# Patient Record
Sex: MALE | Race: WHITE | NOT HISPANIC OR LATINO | Employment: OTHER | URBAN - METROPOLITAN AREA
[De-identification: names, ages, dates, MRNs, and addresses within clinical notes are randomized per-mention and may not be internally consistent; named-entity substitution may affect disease eponyms.]

---

## 2018-01-01 ENCOUNTER — APPOINTMENT (OUTPATIENT)
Dept: CT IMAGING | Facility: HOSPITAL | Age: 78
End: 2018-01-01

## 2018-01-01 ENCOUNTER — HOSPITAL ENCOUNTER (INPATIENT)
Facility: HOSPITAL | Age: 78
LOS: 6 days | End: 2018-10-02
Attending: EMERGENCY MEDICINE | Admitting: INTERNAL MEDICINE

## 2018-01-01 ENCOUNTER — APPOINTMENT (OUTPATIENT)
Dept: GENERAL RADIOLOGY | Facility: HOSPITAL | Age: 78
End: 2018-01-01

## 2018-01-01 ENCOUNTER — APPOINTMENT (OUTPATIENT)
Dept: MRI IMAGING | Facility: HOSPITAL | Age: 78
End: 2018-01-01
Attending: PSYCHIATRY & NEUROLOGY

## 2018-01-01 ENCOUNTER — APPOINTMENT (OUTPATIENT)
Dept: CARDIOLOGY | Facility: HOSPITAL | Age: 78
End: 2018-01-01
Attending: PSYCHIATRY & NEUROLOGY

## 2018-01-01 VITALS
DIASTOLIC BLOOD PRESSURE: 70 MMHG | WEIGHT: 244 LBS | SYSTOLIC BLOOD PRESSURE: 128 MMHG | TEMPERATURE: 98 F | OXYGEN SATURATION: 62 % | BODY MASS INDEX: 33.05 KG/M2 | HEART RATE: 28 BPM | HEIGHT: 72 IN

## 2018-01-01 DIAGNOSIS — I63.9 ACUTE ISCHEMIC STROKE (HCC): Primary | ICD-10-CM

## 2018-01-01 DIAGNOSIS — N28.9 RENAL INSUFFICIENCY: ICD-10-CM

## 2018-01-01 DIAGNOSIS — R41.841 COGNITIVE COMMUNICATION DEFICIT: ICD-10-CM

## 2018-01-01 DIAGNOSIS — Z78.9 IMPAIRED MOBILITY AND ADLS: ICD-10-CM

## 2018-01-01 DIAGNOSIS — M25.552 LEFT HIP PAIN: ICD-10-CM

## 2018-01-01 DIAGNOSIS — Z74.09 IMPAIRED FUNCTIONAL MOBILITY, BALANCE, GAIT, AND ENDURANCE: ICD-10-CM

## 2018-01-01 DIAGNOSIS — Z74.09 IMPAIRED MOBILITY AND ADLS: ICD-10-CM

## 2018-01-01 DIAGNOSIS — R13.10 DYSPHAGIA, UNSPECIFIED TYPE: ICD-10-CM

## 2018-01-01 LAB
ALBUMIN SERPL-MCNC: 2.87 G/DL (ref 3.2–4.8)
ALBUMIN SERPL-MCNC: 2.98 G/DL (ref 3.2–4.8)
ALBUMIN SERPL-MCNC: 3.28 G/DL (ref 3.2–4.8)
ALBUMIN/GLOB SERPL: 1.2 G/DL (ref 1.5–2.5)
ALP SERPL-CCNC: 65 U/L (ref 25–100)
ALP SERPL-CCNC: 86 U/L (ref 25–100)
ALT SERPL W P-5'-P-CCNC: 24 U/L (ref 7–40)
ALT SERPL W P-5'-P-CCNC: 31 U/L (ref 7–40)
ANION GAP SERPL CALCULATED.3IONS-SCNC: 10 MMOL/L (ref 3–11)
ANION GAP SERPL CALCULATED.3IONS-SCNC: 19 MMOL/L (ref 3–11)
ANION GAP SERPL CALCULATED.3IONS-SCNC: 2 MMOL/L (ref 3–11)
ANION GAP SERPL CALCULATED.3IONS-SCNC: 4 MMOL/L (ref 3–11)
ANION GAP SERPL CALCULATED.3IONS-SCNC: 8 MMOL/L (ref 3–11)
ARTICHOKE IGE QN: 62 MG/DL (ref 0–130)
AST SERPL-CCNC: 104 U/L (ref 0–33)
AST SERPL-CCNC: 41 U/L (ref 0–33)
BACTERIA BLD CULT: ABNORMAL
BASOPHILS # BLD AUTO: 0.02 10*3/MM3 (ref 0–0.2)
BASOPHILS # BLD AUTO: 0.03 10*3/MM3 (ref 0–0.2)
BASOPHILS # BLD AUTO: 0.03 10*3/MM3 (ref 0–0.2)
BASOPHILS NFR BLD AUTO: 0.2 % (ref 0–1)
BASOPHILS NFR BLD AUTO: 0.2 % (ref 0–1)
BASOPHILS NFR BLD AUTO: 0.3 % (ref 0–1)
BH CV ECHO MEAS - AO MAX PG (FULL): 2.3 MMHG
BH CV ECHO MEAS - AO MAX PG: 8.6 MMHG
BH CV ECHO MEAS - AO MEAN PG (FULL): 1.6 MMHG
BH CV ECHO MEAS - AO MEAN PG: 3.7 MMHG
BH CV ECHO MEAS - AO ROOT AREA (BSA CORRECTED): 1.4
BH CV ECHO MEAS - AO ROOT AREA: 8.7 CM^2
BH CV ECHO MEAS - AO ROOT DIAM: 3.3 CM
BH CV ECHO MEAS - AO V2 MAX: 147 CM/SEC
BH CV ECHO MEAS - AO V2 MEAN: 86.7 CM/SEC
BH CV ECHO MEAS - AO V2 VTI: 26.2 CM
BH CV ECHO MEAS - ASC AORTA: 2.3 CM
BH CV ECHO MEAS - AVA(I,A): 1.9 CM^2
BH CV ECHO MEAS - AVA(I,D): 1.9 CM^2
BH CV ECHO MEAS - AVA(V,A): 2.6 CM^2
BH CV ECHO MEAS - AVA(V,D): 2.6 CM^2
BH CV ECHO MEAS - BSA(HAYCOCK): 2.4 M^2
BH CV ECHO MEAS - BSA(HAYCOCK): 2.4 M^2
BH CV ECHO MEAS - BSA: 2.3 M^2
BH CV ECHO MEAS - BSA: 2.3 M^2
BH CV ECHO MEAS - BZI_BMI: 33.1 KILOGRAMS/M^2
BH CV ECHO MEAS - BZI_BMI: 33.1 KILOGRAMS/M^2
BH CV ECHO MEAS - BZI_METRIC_HEIGHT: 182.9 CM
BH CV ECHO MEAS - BZI_METRIC_HEIGHT: 182.9 CM
BH CV ECHO MEAS - BZI_METRIC_WEIGHT: 110.7 KG
BH CV ECHO MEAS - BZI_METRIC_WEIGHT: 110.7 KG
BH CV ECHO MEAS - EDV(CUBED): 170.3 ML
BH CV ECHO MEAS - EDV(MOD-SP2): 44 ML
BH CV ECHO MEAS - EDV(MOD-SP4): 72 ML
BH CV ECHO MEAS - EDV(TEICH): 150.1 ML
BH CV ECHO MEAS - EF(CUBED): 26.7 %
BH CV ECHO MEAS - EF(MOD-SP2): 56.8 %
BH CV ECHO MEAS - EF(MOD-SP4): 65.3 %
BH CV ECHO MEAS - EF(TEICH): 21.3 %
BH CV ECHO MEAS - ESV(CUBED): 124.8 ML
BH CV ECHO MEAS - ESV(MOD-SP2): 19 ML
BH CV ECHO MEAS - ESV(MOD-SP4): 25 ML
BH CV ECHO MEAS - ESV(TEICH): 118.1 ML
BH CV ECHO MEAS - FS: 9.8 %
BH CV ECHO MEAS - IVS/LVPW: 1.2
BH CV ECHO MEAS - IVSD: 1.2 CM
BH CV ECHO MEAS - LAD MAJOR: 6 CM
BH CV ECHO MEAS - LAT PEAK E' VEL: 4.7 CM/SEC
BH CV ECHO MEAS - LATERAL E/E' RATIO: 17.3
BH CV ECHO MEAS - LV DIASTOLIC VOL/BSA (35-75): 31.1 ML/M^2
BH CV ECHO MEAS - LV MASS(C)D: 238.3 GRAMS
BH CV ECHO MEAS - LV MASS(C)DI: 102.8 GRAMS/M^2
BH CV ECHO MEAS - LV MAX PG: 6.3 MMHG
BH CV ECHO MEAS - LV MEAN PG: 2 MMHG
BH CV ECHO MEAS - LV SYSTOLIC VOL/BSA (12-30): 10.8 ML/M^2
BH CV ECHO MEAS - LV V1 MAX: 125.6 CM/SEC
BH CV ECHO MEAS - LV V1 MEAN: 61.7 CM/SEC
BH CV ECHO MEAS - LV V1 VTI: 16.7 CM
BH CV ECHO MEAS - LVIDD: 5.5 CM
BH CV ECHO MEAS - LVIDS: 5 CM
BH CV ECHO MEAS - LVLD AP2: 6.8 CM
BH CV ECHO MEAS - LVLD AP4: 6.6 CM
BH CV ECHO MEAS - LVLS AP2: 5.5 CM
BH CV ECHO MEAS - LVLS AP4: 5.7 CM
BH CV ECHO MEAS - LVOT AREA (M): 3.1 CM^2
BH CV ECHO MEAS - LVOT AREA: 3 CM^2
BH CV ECHO MEAS - LVOT DIAM: 2 CM
BH CV ECHO MEAS - LVPWD: 0.98 CM
BH CV ECHO MEAS - MED PEAK E' VEL: 5.3 CM/SEC
BH CV ECHO MEAS - MEDIAL E/E' RATIO: 15.5
BH CV ECHO MEAS - MV A MAX VEL: 65.3 CM/SEC
BH CV ECHO MEAS - MV DEC TIME: 0.22 SEC
BH CV ECHO MEAS - MV E MAX VEL: 83.9 CM/SEC
BH CV ECHO MEAS - MV E/A: 1.3
BH CV ECHO MEAS - MV MAX PG: 3 MMHG
BH CV ECHO MEAS - MV MEAN PG: 0.93 MMHG
BH CV ECHO MEAS - MV V2 MAX: 86.6 CM/SEC
BH CV ECHO MEAS - MV V2 MEAN: 43.2 CM/SEC
BH CV ECHO MEAS - MV V2 VTI: 26.6 CM
BH CV ECHO MEAS - MVA(VTI): 1.9 CM^2
BH CV ECHO MEAS - PA ACC SLOPE: 1071 CM/SEC^2
BH CV ECHO MEAS - PA ACC TIME: 0.11 SEC
BH CV ECHO MEAS - PA MAX PG: 13.8 MMHG
BH CV ECHO MEAS - PA PR(ACCEL): 28.3 MMHG
BH CV ECHO MEAS - PA V2 MAX: 186 CM/SEC
BH CV ECHO MEAS - RVSP: 31 MMHG
BH CV ECHO MEAS - SI(AO): 98 ML/M^2
BH CV ECHO MEAS - SI(CUBED): 19.6 ML/M^2
BH CV ECHO MEAS - SI(LVOT): 21.9 ML/M^2
BH CV ECHO MEAS - SI(MOD-SP2): 10.8 ML/M^2
BH CV ECHO MEAS - SI(MOD-SP4): 20.3 ML/M^2
BH CV ECHO MEAS - SI(TEICH): 13.8 ML/M^2
BH CV ECHO MEAS - SV(AO): 227.3 ML
BH CV ECHO MEAS - SV(CUBED): 45.5 ML
BH CV ECHO MEAS - SV(LVOT): 50.8 ML
BH CV ECHO MEAS - SV(MOD-SP2): 25 ML
BH CV ECHO MEAS - SV(MOD-SP4): 47 ML
BH CV ECHO MEAS - SV(TEICH): 32 ML
BH CV ECHO MEAS - TAPSE (>1.6): 1.11 CM2
BH CV ECHO MEAS - TR MAX PG: 26 MMHG
BH CV ECHO MEAS - TR MAX VEL: 252.3 CM/SEC
BH CV ECHO MEASUREMENTS AVERAGE E/E' RATIO: 16.78
BH CV XLRA - TDI S': 6.89 CM/SEC
BH CV XLRA MEAS LEFT CCA RATIO VEL: 84 CM/SEC
BH CV XLRA MEAS LEFT DIST CCA EDV: 8.3 CM/SEC
BH CV XLRA MEAS LEFT DIST CCA PSV: 78.1 CM/SEC
BH CV XLRA MEAS LEFT DIST ICA EDV: 11.8 CM/SEC
BH CV XLRA MEAS LEFT DIST ICA PSV: 49.6 CM/SEC
BH CV XLRA MEAS LEFT ICA RATIO VEL: 51.1 CM/SEC
BH CV XLRA MEAS LEFT ICA/CCA RATIO: 0.61
BH CV XLRA MEAS LEFT MID CCA EDV: 9.3 CM/SEC
BH CV XLRA MEAS LEFT MID CCA PSV: 71.2 CM/SEC
BH CV XLRA MEAS LEFT MID ICA EDV: 12.3 CM/SEC
BH CV XLRA MEAS LEFT MID ICA PSV: 49.1 CM/SEC
BH CV XLRA MEAS LEFT PROX CCA EDV: 6.9 CM/SEC
BH CV XLRA MEAS LEFT PROX CCA PSV: 84.5 CM/SEC
BH CV XLRA MEAS LEFT PROX ECA EDV: 8.3 CM/SEC
BH CV XLRA MEAS LEFT PROX ECA PSV: 87.4 CM/SEC
BH CV XLRA MEAS LEFT PROX ICA EDV: 9.3 CM/SEC
BH CV XLRA MEAS LEFT PROX ICA PSV: 51.6 CM/SEC
BH CV XLRA MEAS LEFT PROX SCLA PSV: 182.3 CM/SEC
BH CV XLRA MEAS LEFT VERTEBRAL A EDV: 11.3 CM/SEC
BH CV XLRA MEAS LEFT VERTEBRAL A PSV: 47.1 CM/SEC
BH CV XLRA MEAS RIGHT CCA RATIO VEL: 91.8 CM/SEC
BH CV XLRA MEAS RIGHT DIST CCA EDV: 7.4 CM/SEC
BH CV XLRA MEAS RIGHT DIST CCA PSV: 79.6 CM/SEC
BH CV XLRA MEAS RIGHT DIST ICA EDV: 20.1 CM/SEC
BH CV XLRA MEAS RIGHT DIST ICA PSV: 73.5 CM/SEC
BH CV XLRA MEAS RIGHT ICA RATIO VEL: 77 CM/SEC
BH CV XLRA MEAS RIGHT ICA/CCA RATIO: 0.84
BH CV XLRA MEAS RIGHT MID CCA EDV: 9.8 CM/SEC
BH CV XLRA MEAS RIGHT MID CCA PSV: 92.3 CM/SEC
BH CV XLRA MEAS RIGHT MID ICA EDV: 17.6 CM/SEC
BH CV XLRA MEAS RIGHT MID ICA PSV: 73.5 CM/SEC
BH CV XLRA MEAS RIGHT PROX CCA EDV: 10.7 CM/SEC
BH CV XLRA MEAS RIGHT PROX CCA PSV: 91.1 CM/SEC
BH CV XLRA MEAS RIGHT PROX ECA EDV: 11.8 CM/SEC
BH CV XLRA MEAS RIGHT PROX ECA PSV: 134.4 CM/SEC
BH CV XLRA MEAS RIGHT PROX ICA EDV: 14.1 CM/SEC
BH CV XLRA MEAS RIGHT PROX ICA PSV: 77.8 CM/SEC
BH CV XLRA MEAS RIGHT PROX SCLA PSV: 155.4 CM/SEC
BH CV XLRA MEAS RIGHT VERTEBRAL A EDV: 6.6 CM/SEC
BH CV XLRA MEAS RIGHT VERTEBRAL A PSV: 36.3 CM/SEC
BILIRUB SERPL-MCNC: 1.3 MG/DL (ref 0.3–1.2)
BILIRUB SERPL-MCNC: 1.6 MG/DL (ref 0.3–1.2)
BUN BLD-MCNC: 27 MG/DL (ref 9–23)
BUN BLD-MCNC: 32 MG/DL (ref 9–23)
BUN BLD-MCNC: 32 MG/DL (ref 9–23)
BUN BLDA-MCNC: 35 MG/DL (ref 8–26)
BUN/CREAT SERPL: 15.3 (ref 7–25)
BUN/CREAT SERPL: 17.4 (ref 7–25)
BUN/CREAT SERPL: 21.8 (ref 7–25)
BUN/CREAT SERPL: 21.9 (ref 7–25)
CA-I BLDA-SCNC: 1.24 MMOL/L (ref 1.2–1.32)
CALCIUM SPEC-SCNC: 7.9 MG/DL (ref 8.7–10.4)
CALCIUM SPEC-SCNC: 7.9 MG/DL (ref 8.7–10.4)
CALCIUM SPEC-SCNC: 8 MG/DL (ref 8.7–10.4)
CALCIUM SPEC-SCNC: 8.4 MG/DL (ref 8.7–10.4)
CALCIUM SPEC-SCNC: 8.8 MG/DL (ref 8.7–10.4)
CHLORIDE BLDA-SCNC: 103 MMOL/L (ref 98–109)
CHLORIDE SERPL-SCNC: 107 MMOL/L (ref 99–109)
CHLORIDE SERPL-SCNC: 114 MMOL/L (ref 99–109)
CHLORIDE SERPL-SCNC: 114 MMOL/L (ref 99–109)
CHLORIDE SERPL-SCNC: 119 MMOL/L (ref 99–109)
CHLORIDE SERPL-SCNC: 120 MMOL/L (ref 99–109)
CHOLEST SERPL-MCNC: 104 MG/DL (ref 0–200)
CHOLEST SERPL-MCNC: 107 MG/DL (ref 0–200)
CO2 BLDA-SCNC: 28 MMOL/L (ref 24–29)
CO2 SERPL-SCNC: 20 MMOL/L (ref 20–31)
CO2 SERPL-SCNC: 21 MMOL/L (ref 20–31)
CO2 SERPL-SCNC: 22 MMOL/L (ref 20–31)
CO2 SERPL-SCNC: 23 MMOL/L (ref 20–31)
CO2 SERPL-SCNC: 24 MMOL/L (ref 20–31)
CREAT BLD-MCNC: 1.08 MG/DL (ref 0.6–1.3)
CREAT BLD-MCNC: 1.46 MG/DL (ref 0.6–1.3)
CREAT BLD-MCNC: 1.47 MG/DL (ref 0.6–1.3)
CREAT BLD-MCNC: 1.55 MG/DL (ref 0.6–1.3)
CREAT BLD-MCNC: 1.77 MG/DL (ref 0.6–1.3)
CREAT BLDA-MCNC: 1.6 MG/DL (ref 0.6–1.3)
D-LACTATE SERPL-SCNC: 1.8 MMOL/L (ref 0.5–2)
DEPRECATED RDW RBC AUTO: 52.5 FL (ref 37–54)
DEPRECATED RDW RBC AUTO: 53.1 FL (ref 37–54)
DEPRECATED RDW RBC AUTO: 54.3 FL (ref 37–54)
DEPRECATED RDW RBC AUTO: 55.6 FL (ref 37–54)
DEPRECATED RDW RBC AUTO: 56.8 FL (ref 37–54)
DEPRECATED RDW RBC AUTO: 58.1 FL (ref 37–54)
EOSINOPHIL # BLD AUTO: 0.02 10*3/MM3 (ref 0–0.3)
EOSINOPHIL # BLD AUTO: 0.17 10*3/MM3 (ref 0–0.3)
EOSINOPHIL # BLD AUTO: 0.23 10*3/MM3 (ref 0–0.3)
EOSINOPHIL NFR BLD AUTO: 0.1 % (ref 0–3)
EOSINOPHIL NFR BLD AUTO: 1.8 % (ref 0–3)
EOSINOPHIL NFR BLD AUTO: 1.9 % (ref 0–3)
ERYTHROCYTE [DISTWIDTH] IN BLOOD BY AUTOMATED COUNT: 14.8 % (ref 11.3–14.5)
ERYTHROCYTE [DISTWIDTH] IN BLOOD BY AUTOMATED COUNT: 15 % (ref 11.3–14.5)
ERYTHROCYTE [DISTWIDTH] IN BLOOD BY AUTOMATED COUNT: 15.2 % (ref 11.3–14.5)
ERYTHROCYTE [DISTWIDTH] IN BLOOD BY AUTOMATED COUNT: 15.3 % (ref 11.3–14.5)
ERYTHROCYTE [DISTWIDTH] IN BLOOD BY AUTOMATED COUNT: 15.4 % (ref 11.3–14.5)
ERYTHROCYTE [DISTWIDTH] IN BLOOD BY AUTOMATED COUNT: 15.5 % (ref 11.3–14.5)
GFR SERPL CREATININE-BSD FRML MDRD: 37 ML/MIN/1.73
GFR SERPL CREATININE-BSD FRML MDRD: 44 ML/MIN/1.73
GFR SERPL CREATININE-BSD FRML MDRD: 46 ML/MIN/1.73
GFR SERPL CREATININE-BSD FRML MDRD: 47 ML/MIN/1.73
GLOBULIN UR ELPH-MCNC: 2.4 GM/DL
GLUCOSE BLD-MCNC: 114 MG/DL (ref 70–100)
GLUCOSE BLD-MCNC: 117 MG/DL (ref 70–100)
GLUCOSE BLD-MCNC: 130 MG/DL (ref 70–100)
GLUCOSE BLD-MCNC: 151 MG/DL (ref 70–100)
GLUCOSE BLD-MCNC: 215 MG/DL (ref 70–100)
GLUCOSE BLDC GLUCOMTR-MCNC: 112 MG/DL (ref 70–130)
GLUCOSE BLDC GLUCOMTR-MCNC: 114 MG/DL (ref 70–130)
GLUCOSE BLDC GLUCOMTR-MCNC: 126 MG/DL (ref 70–130)
GLUCOSE BLDC GLUCOMTR-MCNC: 129 MG/DL (ref 70–130)
GLUCOSE BLDC GLUCOMTR-MCNC: 130 MG/DL (ref 70–130)
GLUCOSE BLDC GLUCOMTR-MCNC: 130 MG/DL (ref 70–130)
GLUCOSE BLDC GLUCOMTR-MCNC: 132 MG/DL (ref 70–130)
GLUCOSE BLDC GLUCOMTR-MCNC: 133 MG/DL (ref 70–130)
GLUCOSE BLDC GLUCOMTR-MCNC: 137 MG/DL (ref 70–130)
GLUCOSE BLDC GLUCOMTR-MCNC: 139 MG/DL (ref 70–130)
GLUCOSE BLDC GLUCOMTR-MCNC: 144 MG/DL (ref 70–130)
GLUCOSE BLDC GLUCOMTR-MCNC: 154 MG/DL (ref 70–130)
GLUCOSE BLDC GLUCOMTR-MCNC: 166 MG/DL (ref 70–130)
GLUCOSE BLDC GLUCOMTR-MCNC: 176 MG/DL (ref 70–130)
GLUCOSE BLDC GLUCOMTR-MCNC: 177 MG/DL (ref 70–130)
GLUCOSE BLDC GLUCOMTR-MCNC: 181 MG/DL (ref 70–130)
GLUCOSE BLDC GLUCOMTR-MCNC: 186 MG/DL (ref 70–130)
GLUCOSE BLDC GLUCOMTR-MCNC: 193 MG/DL (ref 70–130)
GLUCOSE BLDC GLUCOMTR-MCNC: 193 MG/DL (ref 70–130)
GLUCOSE BLDC GLUCOMTR-MCNC: 210 MG/DL (ref 70–130)
GLUCOSE BLDC GLUCOMTR-MCNC: 218 MG/DL (ref 70–130)
HBA1C MFR BLD: 6.2 % (ref 4.8–5.6)
HCT VFR BLD AUTO: 33 % (ref 38.9–50.9)
HCT VFR BLD AUTO: 36.4 % (ref 38.9–50.9)
HCT VFR BLD AUTO: 36.5 % (ref 38.9–50.9)
HCT VFR BLD AUTO: 36.5 % (ref 38.9–50.9)
HCT VFR BLD AUTO: 41 % (ref 38.9–50.9)
HCT VFR BLD AUTO: 44.9 % (ref 38.9–50.9)
HCT VFR BLDA CALC: 45 % (ref 38–51)
HDLC SERPL-MCNC: 34 MG/DL (ref 40–60)
HGB BLD-MCNC: 10.2 G/DL (ref 13.1–17.5)
HGB BLD-MCNC: 11.4 G/DL (ref 13.1–17.5)
HGB BLD-MCNC: 11.5 G/DL (ref 13.1–17.5)
HGB BLD-MCNC: 11.5 G/DL (ref 13.1–17.5)
HGB BLD-MCNC: 13.5 G/DL (ref 13.1–17.5)
HGB BLD-MCNC: 14.8 G/DL (ref 13.1–17.5)
HGB BLDA-MCNC: 15.3 G/DL (ref 12–17)
HOLD SPECIMEN: NORMAL
HOLD SPECIMEN: NORMAL
IMM GRANULOCYTES # BLD: 0.06 10*3/MM3 (ref 0–0.03)
IMM GRANULOCYTES # BLD: 0.07 10*3/MM3 (ref 0–0.03)
IMM GRANULOCYTES # BLD: 0.09 10*3/MM3 (ref 0–0.03)
IMM GRANULOCYTES NFR BLD: 0.5 % (ref 0–0.6)
IMM GRANULOCYTES NFR BLD: 0.6 % (ref 0–0.6)
IMM GRANULOCYTES NFR BLD: 0.7 % (ref 0–0.6)
INR PPP: 1.3 (ref 0.8–1.2)
LEFT ATRIUM VOLUME INDEX: 22.9 ML/M^2
LYMPHOCYTES # BLD AUTO: 1.09 10*3/MM3 (ref 0.6–4.8)
LYMPHOCYTES # BLD AUTO: 1.11 10*3/MM3 (ref 0.6–4.8)
LYMPHOCYTES # BLD AUTO: 1.34 10*3/MM3 (ref 0.6–4.8)
LYMPHOCYTES NFR BLD AUTO: 11.5 % (ref 24–44)
LYMPHOCYTES NFR BLD AUTO: 8.3 % (ref 24–44)
LYMPHOCYTES NFR BLD AUTO: 9.2 % (ref 24–44)
MAGNESIUM SERPL-MCNC: 2 MG/DL (ref 1.3–2.7)
MAGNESIUM SERPL-MCNC: 2.1 MG/DL (ref 1.3–2.7)
MAGNESIUM SERPL-MCNC: 2.1 MG/DL (ref 1.3–2.7)
MAGNESIUM SERPL-MCNC: 2.2 MG/DL (ref 1.3–2.7)
MAXIMAL PREDICTED HEART RATE: 143 BPM
MCH RBC QN AUTO: 31.4 PG (ref 27–31)
MCH RBC QN AUTO: 31.4 PG (ref 27–31)
MCH RBC QN AUTO: 31.6 PG (ref 27–31)
MCH RBC QN AUTO: 31.7 PG (ref 27–31)
MCH RBC QN AUTO: 31.9 PG (ref 27–31)
MCH RBC QN AUTO: 32 PG (ref 27–31)
MCHC RBC AUTO-ENTMCNC: 30.9 G/DL (ref 32–36)
MCHC RBC AUTO-ENTMCNC: 31.2 G/DL (ref 32–36)
MCHC RBC AUTO-ENTMCNC: 31.5 G/DL (ref 32–36)
MCHC RBC AUTO-ENTMCNC: 31.6 G/DL (ref 32–36)
MCHC RBC AUTO-ENTMCNC: 32.9 G/DL (ref 32–36)
MCHC RBC AUTO-ENTMCNC: 33 G/DL (ref 32–36)
MCV RBC AUTO: 101.1 FL (ref 80–99)
MCV RBC AUTO: 102.5 FL (ref 80–99)
MCV RBC AUTO: 96.9 FL (ref 80–99)
MCV RBC AUTO: 97 FL (ref 80–99)
MCV RBC AUTO: 99.5 FL (ref 80–99)
MCV RBC AUTO: 99.7 FL (ref 80–99)
MONOCYTES # BLD AUTO: 0.54 10*3/MM3 (ref 0–1)
MONOCYTES # BLD AUTO: 0.6 10*3/MM3 (ref 0–1)
MONOCYTES # BLD AUTO: 0.66 10*3/MM3 (ref 0–1)
MONOCYTES NFR BLD AUTO: 4.1 % (ref 0–12)
MONOCYTES NFR BLD AUTO: 5 % (ref 0–12)
MONOCYTES NFR BLD AUTO: 5.7 % (ref 0–12)
NEUTROPHILS # BLD AUTO: 10 10*3/MM3 (ref 1.5–8.3)
NEUTROPHILS # BLD AUTO: 14.17 10*3/MM3 (ref 1.5–8.3)
NEUTROPHILS # BLD AUTO: 7.62 10*3/MM3 (ref 1.5–8.3)
NEUTROPHILS NFR BLD AUTO: 80.7 % (ref 41–71)
NEUTROPHILS NFR BLD AUTO: 83.2 % (ref 41–71)
NEUTROPHILS NFR BLD AUTO: 87.3 % (ref 41–71)
PHOSPHATE SERPL-MCNC: 1.2 MG/DL (ref 2.4–5.1)
PHOSPHATE SERPL-MCNC: 2.2 MG/DL (ref 2.4–5.1)
PHOSPHATE SERPL-MCNC: 2.2 MG/DL (ref 2.4–5.1)
PHOSPHATE SERPL-MCNC: 2.5 MG/DL (ref 2.4–5.1)
PHOSPHATE SERPL-MCNC: 3.2 MG/DL (ref 2.4–5.1)
PHOSPHATE SERPL-MCNC: 4 MG/DL (ref 2.4–5.1)
PLATELET # BLD AUTO: 137 10*3/MM3 (ref 150–450)
PLATELET # BLD AUTO: 138 10*3/MM3 (ref 150–450)
PLATELET # BLD AUTO: 139 10*3/MM3 (ref 150–450)
PLATELET # BLD AUTO: 141 10*3/MM3 (ref 150–450)
PLATELET # BLD AUTO: 143 10*3/MM3 (ref 150–450)
PLATELET # BLD AUTO: 160 10*3/MM3 (ref 150–450)
PMV BLD AUTO: 10.2 FL (ref 6–12)
PMV BLD AUTO: 10.3 FL (ref 6–12)
PMV BLD AUTO: 10.4 FL (ref 6–12)
PMV BLD AUTO: 10.5 FL (ref 6–12)
POTASSIUM BLD-SCNC: 3.8 MMOL/L (ref 3.5–5.5)
POTASSIUM BLD-SCNC: 3.8 MMOL/L (ref 3.5–5.5)
POTASSIUM BLD-SCNC: 4.1 MMOL/L (ref 3.5–5.5)
POTASSIUM BLD-SCNC: 4.2 MMOL/L (ref 3.5–5.5)
POTASSIUM BLD-SCNC: 4.5 MMOL/L (ref 3.5–5.5)
POTASSIUM BLDA-SCNC: 3.9 MMOL/L (ref 3.5–4.9)
PREALB SERPL-MCNC: <5 MG/DL (ref 10–40)
PROT SERPL-MCNC: 5.3 G/DL (ref 5.7–8.2)
PROT SERPL-MCNC: 5.7 G/DL (ref 5.7–8.2)
PROTHROMBIN TIME: 15.2 SECONDS (ref 12.8–15.2)
RBC # BLD AUTO: 3.22 10*6/MM3 (ref 4.2–5.76)
RBC # BLD AUTO: 3.61 10*6/MM3 (ref 4.2–5.76)
RBC # BLD AUTO: 3.66 10*6/MM3 (ref 4.2–5.76)
RBC # BLD AUTO: 3.66 10*6/MM3 (ref 4.2–5.76)
RBC # BLD AUTO: 4.23 10*6/MM3 (ref 4.2–5.76)
RBC # BLD AUTO: 4.63 10*6/MM3 (ref 4.2–5.76)
SODIUM BLD-SCNC: 140 MMOL/L (ref 132–146)
SODIUM BLD-SCNC: 144 MMOL/L (ref 132–146)
SODIUM BLD-SCNC: 145 MMOL/L (ref 132–146)
SODIUM BLD-SCNC: 147 MMOL/L (ref 132–146)
SODIUM BLD-SCNC: 151 MMOL/L (ref 132–146)
SODIUM BLDA-SCNC: 144 MMOL/L (ref 138–146)
STRESS TARGET HR: 122 BPM
TRIGL SERPL-MCNC: 93 MG/DL (ref 0–150)
TRIGL SERPL-MCNC: 97 MG/DL (ref 0–150)
TROPONIN I SERPL-MCNC: 0.2 NG/ML
TROPONIN I SERPL-MCNC: 2.41 NG/ML
TROPONIN I SERPL-MCNC: 4.03 NG/ML
VANCOMYCIN TROUGH SERPL-MCNC: 23.3 MCG/ML (ref 10–20)
WBC NRBC COR # BLD: 12.02 10*3/MM3 (ref 3.5–10.8)
WBC NRBC COR # BLD: 12.66 10*3/MM3 (ref 3.5–10.8)
WBC NRBC COR # BLD: 13.3 10*3/MM3 (ref 3.5–10.8)
WBC NRBC COR # BLD: 13.69 10*3/MM3 (ref 3.5–10.8)
WBC NRBC COR # BLD: 16.22 10*3/MM3 (ref 3.5–10.8)
WBC NRBC COR # BLD: 9.45 10*3/MM3 (ref 3.5–10.8)
WHOLE BLOOD HOLD SPECIMEN: NORMAL
WHOLE BLOOD HOLD SPECIMEN: NORMAL

## 2018-01-01 PROCEDURE — 99291 CRITICAL CARE FIRST HOUR: CPT | Performed by: INTERNAL MEDICINE

## 2018-01-01 PROCEDURE — 82465 ASSAY BLD/SERUM CHOLESTEROL: CPT

## 2018-01-01 PROCEDURE — 99232 SBSQ HOSP IP/OBS MODERATE 35: CPT | Performed by: PSYCHIATRY & NEUROLOGY

## 2018-01-01 PROCEDURE — 84478 ASSAY OF TRIGLYCERIDES: CPT

## 2018-01-01 PROCEDURE — 84134 ASSAY OF PREALBUMIN: CPT

## 2018-01-01 PROCEDURE — 87077 CULTURE AEROBIC IDENTIFY: CPT | Performed by: NURSE PRACTITIONER

## 2018-01-01 PROCEDURE — 82962 GLUCOSE BLOOD TEST: CPT

## 2018-01-01 PROCEDURE — 84100 ASSAY OF PHOSPHORUS: CPT | Performed by: INTERNAL MEDICINE

## 2018-01-01 PROCEDURE — 93880 EXTRACRANIAL BILAT STUDY: CPT | Performed by: INTERNAL MEDICINE

## 2018-01-01 PROCEDURE — 83735 ASSAY OF MAGNESIUM: CPT | Performed by: INTERNAL MEDICINE

## 2018-01-01 PROCEDURE — 94799 UNLISTED PULMONARY SVC/PX: CPT

## 2018-01-01 PROCEDURE — 94660 CPAP INITIATION&MGMT: CPT

## 2018-01-01 PROCEDURE — 93306 TTE W/DOPPLER COMPLETE: CPT | Performed by: INTERNAL MEDICINE

## 2018-01-01 PROCEDURE — 93010 ELECTROCARDIOGRAM REPORT: CPT | Performed by: INTERNAL MEDICINE

## 2018-01-01 PROCEDURE — 80053 COMPREHEN METABOLIC PANEL: CPT | Performed by: INTERNAL MEDICINE

## 2018-01-01 PROCEDURE — 85027 COMPLETE CBC AUTOMATED: CPT | Performed by: INTERNAL MEDICINE

## 2018-01-01 PROCEDURE — 84484 ASSAY OF TROPONIN QUANT: CPT | Performed by: NURSE PRACTITIONER

## 2018-01-01 PROCEDURE — 93005 ELECTROCARDIOGRAM TRACING: CPT | Performed by: INTERNAL MEDICINE

## 2018-01-01 PROCEDURE — 99233 SBSQ HOSP IP/OBS HIGH 50: CPT | Performed by: INTERNAL MEDICINE

## 2018-01-01 PROCEDURE — 87186 SC STD MICRODIL/AGAR DIL: CPT | Performed by: NURSE PRACTITIONER

## 2018-01-01 PROCEDURE — 99231 SBSQ HOSP IP/OBS SF/LOW 25: CPT | Performed by: PSYCHIATRY & NEUROLOGY

## 2018-01-01 PROCEDURE — 25010000002 MORPHINE SULFATE (PF) 2 MG/ML SOLUTION: Performed by: NURSE PRACTITIONER

## 2018-01-01 PROCEDURE — 71045 X-RAY EXAM CHEST 1 VIEW: CPT

## 2018-01-01 PROCEDURE — 25010000002 LORAZEPAM PER 2 MG: Performed by: NURSE PRACTITIONER

## 2018-01-01 PROCEDURE — 85025 COMPLETE CBC W/AUTO DIFF WBC: CPT | Performed by: INTERNAL MEDICINE

## 2018-01-01 PROCEDURE — 94640 AIRWAY INHALATION TREATMENT: CPT

## 2018-01-01 PROCEDURE — 80053 COMPREHEN METABOLIC PANEL: CPT

## 2018-01-01 PROCEDURE — 3E0G76Z INTRODUCTION OF NUTRITIONAL SUBSTANCE INTO UPPER GI, VIA NATURAL OR ARTIFICIAL OPENING: ICD-10-PCS | Performed by: PSYCHIATRY & NEUROLOGY

## 2018-01-01 PROCEDURE — 93306 TTE W/DOPPLER COMPLETE: CPT

## 2018-01-01 PROCEDURE — 25010000002 MORPHINE PER 10 MG: Performed by: NURSE PRACTITIONER

## 2018-01-01 PROCEDURE — 3E03317 INTRODUCTION OF OTHER THROMBOLYTIC INTO PERIPHERAL VEIN, PERCUTANEOUS APPROACH: ICD-10-PCS | Performed by: INTERNAL MEDICINE

## 2018-01-01 PROCEDURE — 70496 CT ANGIOGRAPHY HEAD: CPT

## 2018-01-01 PROCEDURE — 80048 BASIC METABOLIC PNL TOTAL CA: CPT | Performed by: INTERNAL MEDICINE

## 2018-01-01 PROCEDURE — 83605 ASSAY OF LACTIC ACID: CPT | Performed by: INTERNAL MEDICINE

## 2018-01-01 PROCEDURE — 25010000002 LEVOFLOXACIN PER 250 MG: Performed by: NURSE PRACTITIONER

## 2018-01-01 PROCEDURE — 25010000002 PIPERACILLIN SOD-TAZOBACTAM PER 1 G: Performed by: NURSE PRACTITIONER

## 2018-01-01 PROCEDURE — 97110 THERAPEUTIC EXERCISES: CPT

## 2018-01-01 PROCEDURE — 92523 SPEECH SOUND LANG COMPREHEN: CPT

## 2018-01-01 PROCEDURE — 70450 CT HEAD/BRAIN W/O DYE: CPT

## 2018-01-01 PROCEDURE — 63710000001 INSULIN REGULAR HUMAN PER 5 UNITS: Performed by: NURSE PRACTITIONER

## 2018-01-01 PROCEDURE — 74018 RADEX ABDOMEN 1 VIEW: CPT

## 2018-01-01 PROCEDURE — 0042T HC CT CEREBRAL PERFUSION W/WO CONTRAST: CPT

## 2018-01-01 PROCEDURE — 72170 X-RAY EXAM OF PELVIS: CPT

## 2018-01-01 PROCEDURE — 83036 HEMOGLOBIN GLYCOSYLATED A1C: CPT | Performed by: PSYCHIATRY & NEUROLOGY

## 2018-01-01 PROCEDURE — 84100 ASSAY OF PHOSPHORUS: CPT

## 2018-01-01 PROCEDURE — 85014 HEMATOCRIT: CPT

## 2018-01-01 PROCEDURE — 80202 ASSAY OF VANCOMYCIN: CPT

## 2018-01-01 PROCEDURE — 25010000002 ALTEPLASE PER 1 MG: Performed by: EMERGENCY MEDICINE

## 2018-01-01 PROCEDURE — 0 IOPAMIDOL PER 1 ML: Performed by: EMERGENCY MEDICINE

## 2018-01-01 PROCEDURE — 92610 EVALUATE SWALLOWING FUNCTION: CPT

## 2018-01-01 PROCEDURE — 83735 ASSAY OF MAGNESIUM: CPT

## 2018-01-01 PROCEDURE — 70498 CT ANGIOGRAPHY NECK: CPT

## 2018-01-01 PROCEDURE — 97165 OT EVAL LOW COMPLEX 30 MIN: CPT

## 2018-01-01 PROCEDURE — 70551 MRI BRAIN STEM W/O DYE: CPT

## 2018-01-01 PROCEDURE — 93005 ELECTROCARDIOGRAM TRACING: CPT | Performed by: NURSE PRACTITIONER

## 2018-01-01 PROCEDURE — 93005 ELECTROCARDIOGRAM TRACING: CPT | Performed by: EMERGENCY MEDICINE

## 2018-01-01 PROCEDURE — 85025 COMPLETE CBC W/AUTO DIFF WBC: CPT | Performed by: EMERGENCY MEDICINE

## 2018-01-01 PROCEDURE — 92507 TX SP LANG VOICE COMM INDIV: CPT

## 2018-01-01 PROCEDURE — 99223 1ST HOSP IP/OBS HIGH 75: CPT | Performed by: PSYCHIATRY & NEUROLOGY

## 2018-01-01 PROCEDURE — 80047 BASIC METABLC PNL IONIZED CA: CPT

## 2018-01-01 PROCEDURE — 80069 RENAL FUNCTION PANEL: CPT | Performed by: INTERNAL MEDICINE

## 2018-01-01 PROCEDURE — 72192 CT PELVIS W/O DYE: CPT

## 2018-01-01 PROCEDURE — 87040 BLOOD CULTURE FOR BACTERIA: CPT | Performed by: NURSE PRACTITIONER

## 2018-01-01 PROCEDURE — 85610 PROTHROMBIN TIME: CPT

## 2018-01-01 PROCEDURE — 99291 CRITICAL CARE FIRST HOUR: CPT

## 2018-01-01 PROCEDURE — 80061 LIPID PANEL: CPT | Performed by: PSYCHIATRY & NEUROLOGY

## 2018-01-01 PROCEDURE — 5A09457 ASSISTANCE WITH RESPIRATORY VENTILATION, 24-96 CONSECUTIVE HOURS, CONTINUOUS POSITIVE AIRWAY PRESSURE: ICD-10-PCS | Performed by: NURSE PRACTITIONER

## 2018-01-01 PROCEDURE — 25010000002 VANCOMYCIN PER 500 MG

## 2018-01-01 PROCEDURE — 93005 ELECTROCARDIOGRAM TRACING: CPT | Performed by: PSYCHIATRY & NEUROLOGY

## 2018-01-01 PROCEDURE — 93880 EXTRACRANIAL BILAT STUDY: CPT

## 2018-01-01 PROCEDURE — 25010000002 VANCOMYCIN 10 G RECONSTITUTED SOLUTION

## 2018-01-01 PROCEDURE — 87147 CULTURE TYPE IMMUNOLOGIC: CPT | Performed by: NURSE PRACTITIONER

## 2018-01-01 PROCEDURE — 87150 DNA/RNA AMPLIFIED PROBE: CPT | Performed by: NURSE PRACTITIONER

## 2018-01-01 PROCEDURE — 84484 ASSAY OF TROPONIN QUANT: CPT | Performed by: EMERGENCY MEDICINE

## 2018-01-01 PROCEDURE — 97163 PT EVAL HIGH COMPLEX 45 MIN: CPT

## 2018-01-01 RX ORDER — LORAZEPAM 2 MG/ML
1 INJECTION INTRAMUSCULAR
Status: DISCONTINUED | OUTPATIENT
Start: 2018-01-01 | End: 2018-01-01 | Stop reason: HOSPADM

## 2018-01-01 RX ORDER — ACETAMINOPHEN 325 MG/1
650 TABLET ORAL EVERY 4 HOURS PRN
Status: DISCONTINUED | OUTPATIENT
Start: 2018-01-01 | End: 2018-01-01

## 2018-01-01 RX ORDER — MORPHINE SULFATE 1 MG/ML
INJECTION INTRAVENOUS CONTINUOUS
Status: DISCONTINUED | OUTPATIENT
Start: 2018-01-01 | End: 2018-01-01

## 2018-01-01 RX ORDER — VANCOMYCIN HYDROCHLORIDE 1 G/200ML
1000 INJECTION, SOLUTION INTRAVENOUS EVERY 24 HOURS
Status: DISCONTINUED | OUTPATIENT
Start: 2018-01-01 | End: 2018-01-01

## 2018-01-01 RX ORDER — ACETYLCYSTEINE 200 MG/ML
4 SOLUTION ORAL; RESPIRATORY (INHALATION)
Status: COMPLETED | OUTPATIENT
Start: 2018-01-01 | End: 2018-01-01

## 2018-01-01 RX ORDER — CYCLOSPORINE 0.5 MG/ML
1 EMULSION OPHTHALMIC 3 TIMES DAILY
Status: DISCONTINUED | OUTPATIENT
Start: 2018-01-01 | End: 2018-01-01 | Stop reason: HOSPADM

## 2018-01-01 RX ORDER — SIMVASTATIN 20 MG
20 TABLET ORAL NIGHTLY
COMMUNITY

## 2018-01-01 RX ORDER — ATORVASTATIN CALCIUM 40 MG/1
80 TABLET, FILM COATED ORAL NIGHTLY
Status: DISCONTINUED | OUTPATIENT
Start: 2018-01-01 | End: 2018-01-01

## 2018-01-01 RX ORDER — ACETAMINOPHEN 160 MG/5ML
650 SOLUTION ORAL EVERY 6 HOURS PRN
Status: DISCONTINUED | OUTPATIENT
Start: 2018-01-01 | End: 2018-01-01

## 2018-01-01 RX ORDER — SODIUM CHLORIDE 0.9 % (FLUSH) 0.9 %
1-10 SYRINGE (ML) INJECTION AS NEEDED
Status: DISCONTINUED | OUTPATIENT
Start: 2018-01-01 | End: 2018-01-01 | Stop reason: HOSPADM

## 2018-01-01 RX ORDER — MORPHINE SULFATE 2 MG/ML
2 INJECTION, SOLUTION INTRAMUSCULAR; INTRAVENOUS
Status: DISCONTINUED | OUTPATIENT
Start: 2018-01-01 | End: 2018-01-01 | Stop reason: HOSPADM

## 2018-01-01 RX ORDER — NICOTINE POLACRILEX 4 MG
15 LOZENGE BUCCAL
Status: DISCONTINUED | OUTPATIENT
Start: 2018-01-01 | End: 2018-01-01

## 2018-01-01 RX ORDER — LORAZEPAM 2 MG/ML
0.5 INJECTION INTRAMUSCULAR EVERY 4 HOURS PRN
Status: DISCONTINUED | OUTPATIENT
Start: 2018-01-01 | End: 2018-01-01

## 2018-01-01 RX ORDER — GLIMEPIRIDE 2 MG/1
2 TABLET ORAL
COMMUNITY

## 2018-01-01 RX ORDER — MORPHINE SULFATE 1 MG/ML
INJECTION INTRAVENOUS CONTINUOUS
Status: DISCONTINUED | OUTPATIENT
Start: 2018-01-01 | End: 2018-01-01 | Stop reason: HOSPADM

## 2018-01-01 RX ORDER — DEXTROSE MONOHYDRATE 50 MG/ML
125 INJECTION, SOLUTION INTRAVENOUS CONTINUOUS
Status: DISCONTINUED | OUTPATIENT
Start: 2018-01-01 | End: 2018-01-01

## 2018-01-01 RX ORDER — SODIUM CHLORIDE 9 MG/ML
100 INJECTION, SOLUTION INTRAVENOUS ONCE
Status: COMPLETED | OUTPATIENT
Start: 2018-01-01 | End: 2018-01-01

## 2018-01-01 RX ORDER — MORPHINE SULFATE 2 MG/ML
2 INJECTION, SOLUTION INTRAMUSCULAR; INTRAVENOUS
Status: DISCONTINUED | OUTPATIENT
Start: 2018-01-01 | End: 2018-01-01

## 2018-01-01 RX ORDER — LISINOPRIL 20 MG/1
20 TABLET ORAL 2 TIMES DAILY
COMMUNITY

## 2018-01-01 RX ORDER — ASPIRIN 325 MG
325 TABLET ORAL DAILY
Status: DISCONTINUED | OUTPATIENT
Start: 2018-01-01 | End: 2018-01-01

## 2018-01-01 RX ORDER — LORAZEPAM 2 MG/ML
1 INJECTION INTRAMUSCULAR ONCE
Status: COMPLETED | OUTPATIENT
Start: 2018-01-01 | End: 2018-01-01

## 2018-01-01 RX ORDER — SODIUM CHLORIDE 0.9 % (FLUSH) 0.9 %
10 SYRINGE (ML) INJECTION AS NEEDED
Status: DISCONTINUED | OUTPATIENT
Start: 2018-01-01 | End: 2018-01-01 | Stop reason: HOSPADM

## 2018-01-01 RX ORDER — LEVOFLOXACIN 5 MG/ML
750 INJECTION, SOLUTION INTRAVENOUS
Status: DISCONTINUED | OUTPATIENT
Start: 2018-01-01 | End: 2018-01-01

## 2018-01-01 RX ORDER — VANCOMYCIN HYDROCHLORIDE 1 G/200ML
1000 INJECTION, SOLUTION INTRAVENOUS EVERY 12 HOURS
Status: DISCONTINUED | OUTPATIENT
Start: 2018-01-01 | End: 2018-01-01

## 2018-01-01 RX ORDER — RANOLAZINE 500 MG/1
500 TABLET, EXTENDED RELEASE ORAL 2 TIMES DAILY
COMMUNITY

## 2018-01-01 RX ORDER — METOPROLOL SUCCINATE 50 MG/1
50 TABLET, EXTENDED RELEASE ORAL DAILY
COMMUNITY

## 2018-01-01 RX ORDER — ISOSORBIDE MONONITRATE 20 MG/1
60 TABLET ORAL 2 TIMES DAILY
COMMUNITY

## 2018-01-01 RX ORDER — ASPIRIN 300 MG/1
300 SUPPOSITORY RECTAL DAILY
Status: DISCONTINUED | OUTPATIENT
Start: 2018-01-01 | End: 2018-01-01

## 2018-01-01 RX ORDER — LORAZEPAM 2 MG/ML
1 INJECTION INTRAMUSCULAR EVERY 4 HOURS PRN
Status: DISCONTINUED | OUTPATIENT
Start: 2018-01-01 | End: 2018-01-01

## 2018-01-01 RX ORDER — MORPHINE SULFATE 2 MG/ML
1 INJECTION, SOLUTION INTRAMUSCULAR; INTRAVENOUS
Status: DISCONTINUED | OUTPATIENT
Start: 2018-01-01 | End: 2018-01-01

## 2018-01-01 RX ORDER — POLYVINYL ALCOHOL 14 MG/ML
1 SOLUTION/ DROPS OPHTHALMIC 3 TIMES DAILY
Status: DISCONTINUED | OUTPATIENT
Start: 2018-01-01 | End: 2018-01-01

## 2018-01-01 RX ORDER — SCOLOPAMINE TRANSDERMAL SYSTEM 1 MG/1
1 PATCH, EXTENDED RELEASE TRANSDERMAL
Status: DISCONTINUED | OUTPATIENT
Start: 2018-01-01 | End: 2018-01-01 | Stop reason: HOSPADM

## 2018-01-01 RX ORDER — FAMOTIDINE 10 MG/ML
20 INJECTION, SOLUTION INTRAVENOUS EVERY 12 HOURS SCHEDULED
Status: DISCONTINUED | OUTPATIENT
Start: 2018-01-01 | End: 2018-01-01

## 2018-01-01 RX ORDER — METOPROLOL TARTRATE 5 MG/5ML
5 INJECTION INTRAVENOUS ONCE
Status: COMPLETED | OUTPATIENT
Start: 2018-01-01 | End: 2018-01-01

## 2018-01-01 RX ORDER — FUROSEMIDE 40 MG/1
40 TABLET ORAL DAILY
COMMUNITY

## 2018-01-01 RX ORDER — DEXTROSE MONOHYDRATE 25 G/50ML
25 INJECTION, SOLUTION INTRAVENOUS
Status: DISCONTINUED | OUTPATIENT
Start: 2018-01-01 | End: 2018-01-01

## 2018-01-01 RX ORDER — IPRATROPIUM BROMIDE AND ALBUTEROL SULFATE 2.5; .5 MG/3ML; MG/3ML
3 SOLUTION RESPIRATORY (INHALATION)
Status: COMPLETED | OUTPATIENT
Start: 2018-01-01 | End: 2018-01-01

## 2018-01-01 RX ORDER — NALOXONE HCL 0.4 MG/ML
0.1 VIAL (ML) INJECTION
Status: DISCONTINUED | OUTPATIENT
Start: 2018-01-01 | End: 2018-01-01

## 2018-01-01 RX ORDER — NITROGLYCERIN 0.4 MG/1
0.4 TABLET SUBLINGUAL
COMMUNITY

## 2018-01-01 RX ORDER — LORAZEPAM 2 MG/ML
0.5 INJECTION INTRAMUSCULAR EVERY 6 HOURS
Status: DISCONTINUED | OUTPATIENT
Start: 2018-01-01 | End: 2018-01-01 | Stop reason: HOSPADM

## 2018-01-01 RX ORDER — SODIUM CHLORIDE 9 MG/ML
75 INJECTION, SOLUTION INTRAVENOUS CONTINUOUS
Status: DISCONTINUED | OUTPATIENT
Start: 2018-01-01 | End: 2018-01-01

## 2018-01-01 RX ORDER — BISACODYL 10 MG
10 SUPPOSITORY, RECTAL RECTAL DAILY PRN
Status: DISCONTINUED | OUTPATIENT
Start: 2018-01-01 | End: 2018-01-01 | Stop reason: HOSPADM

## 2018-01-01 RX ORDER — ACETAMINOPHEN 650 MG/1
650 SUPPOSITORY RECTAL EVERY 4 HOURS PRN
Status: DISCONTINUED | OUTPATIENT
Start: 2018-01-01 | End: 2018-01-01

## 2018-01-01 RX ORDER — GLYCOPYRROLATE 0.2 MG/ML
0.2 INJECTION INTRAMUSCULAR; INTRAVENOUS
Status: DISCONTINUED | OUTPATIENT
Start: 2018-01-01 | End: 2018-01-01

## 2018-01-01 RX ADMIN — MORPHINE SULFATE 2 MG: 2 INJECTION, SOLUTION INTRAMUSCULAR; INTRAVENOUS at 15:31

## 2018-01-01 RX ADMIN — SODIUM CHLORIDE 75 ML/HR: 9 INJECTION, SOLUTION INTRAVENOUS at 17:04

## 2018-01-01 RX ADMIN — MORPHINE SULFATE 2 MG: 2 INJECTION, SOLUTION INTRAMUSCULAR; INTRAVENOUS at 15:01

## 2018-01-01 RX ADMIN — LORAZEPAM 1 MG: 2 INJECTION INTRAMUSCULAR; INTRAVENOUS at 15:01

## 2018-01-01 RX ADMIN — LORAZEPAM 0.5 MG: 2 INJECTION INTRAMUSCULAR; INTRAVENOUS at 21:53

## 2018-01-01 RX ADMIN — LEVOFLOXACIN 750 MG: 5 INJECTION, SOLUTION INTRAVENOUS at 05:31

## 2018-01-01 RX ADMIN — METOPROLOL TARTRATE 5 MG: 1 INJECTION, SOLUTION INTRAVENOUS at 07:21

## 2018-01-01 RX ADMIN — VANCOMYCIN HYDROCHLORIDE 1000 MG: 1 INJECTION, SOLUTION INTRAVENOUS at 01:55

## 2018-01-01 RX ADMIN — INSULIN HUMAN 2 UNITS: 100 INJECTION, SOLUTION PARENTERAL at 12:20

## 2018-01-01 RX ADMIN — TAZOBACTAM SODIUM AND PIPERACILLIN SODIUM 4.5 G: 500; 4 INJECTION, SOLUTION INTRAVENOUS at 15:45

## 2018-01-01 RX ADMIN — MORPHINE SULFATE 1 MG: 2 INJECTION, SOLUTION INTRAMUSCULAR; INTRAVENOUS at 17:49

## 2018-01-01 RX ADMIN — FAMOTIDINE 20 MG: 10 INJECTION INTRAVENOUS at 08:09

## 2018-01-01 RX ADMIN — FAMOTIDINE 20 MG: 10 INJECTION INTRAVENOUS at 21:18

## 2018-01-01 RX ADMIN — SODIUM CHLORIDE 10 MG/HR: 9 INJECTION, SOLUTION INTRAVENOUS at 23:01

## 2018-01-01 RX ADMIN — LORAZEPAM 0.5 MG: 2 INJECTION INTRAMUSCULAR; INTRAVENOUS at 03:51

## 2018-01-01 RX ADMIN — GLYCOPYRROLATE 0.2 MG: 0.2 INJECTION, SOLUTION INTRAMUSCULAR; INTRAVENOUS at 14:02

## 2018-01-01 RX ADMIN — TAZOBACTAM SODIUM AND PIPERACILLIN SODIUM 4.5 G: 500; 4 INJECTION, SOLUTION INTRAVENOUS at 06:29

## 2018-01-01 RX ADMIN — IPRATROPIUM BROMIDE AND ALBUTEROL SULFATE 3 ML: 2.5; .5 SOLUTION RESPIRATORY (INHALATION) at 19:20

## 2018-01-01 RX ADMIN — MORPHINE SULFATE 1 MG: 2 INJECTION, SOLUTION INTRAMUSCULAR; INTRAVENOUS at 19:25

## 2018-01-01 RX ADMIN — LORAZEPAM 0.5 MG: 2 INJECTION INTRAMUSCULAR; INTRAVENOUS at 16:34

## 2018-01-01 RX ADMIN — TAZOBACTAM SODIUM AND PIPERACILLIN SODIUM 4.5 G: 500; 4 INJECTION, SOLUTION INTRAVENOUS at 05:28

## 2018-01-01 RX ADMIN — INSULIN HUMAN 2 UNITS: 100 INJECTION, SOLUTION PARENTERAL at 11:59

## 2018-01-01 RX ADMIN — TAZOBACTAM SODIUM AND PIPERACILLIN SODIUM 4.5 G: 500; 4 INJECTION, SOLUTION INTRAVENOUS at 21:18

## 2018-01-01 RX ADMIN — LORAZEPAM 0.5 MG: 2 INJECTION INTRAMUSCULAR; INTRAVENOUS at 22:13

## 2018-01-01 RX ADMIN — SODIUM CHLORIDE 100 ML/HR: 9 INJECTION, SOLUTION INTRAVENOUS at 12:03

## 2018-01-01 RX ADMIN — MORPHINE SULFATE: 1 INJECTION, SOLUTION INTRAVENOUS at 11:22

## 2018-01-01 RX ADMIN — MORPHINE SULFATE 1 MG: 2 INJECTION, SOLUTION INTRAMUSCULAR; INTRAVENOUS at 23:58

## 2018-01-01 RX ADMIN — IOPAMIDOL 115 ML: 755 INJECTION, SOLUTION INTRAVENOUS at 10:30

## 2018-01-01 RX ADMIN — IPRATROPIUM BROMIDE AND ALBUTEROL SULFATE 3 ML: 2.5; .5 SOLUTION RESPIRATORY (INHALATION) at 12:15

## 2018-01-01 RX ADMIN — DEXTROSE MONOHYDRATE 125 ML/HR: 50 INJECTION, SOLUTION INTRAVENOUS at 06:37

## 2018-01-01 RX ADMIN — INSULIN HUMAN 2 UNITS: 100 INJECTION, SOLUTION PARENTERAL at 06:23

## 2018-01-01 RX ADMIN — MORPHINE SULFATE 2 MG: 2 INJECTION, SOLUTION INTRAMUSCULAR; INTRAVENOUS at 16:29

## 2018-01-01 RX ADMIN — MORPHINE SULFATE: 1 INJECTION INTRAVENOUS at 13:05

## 2018-01-01 RX ADMIN — MORPHINE SULFATE 1 MG: 2 INJECTION, SOLUTION INTRAMUSCULAR; INTRAVENOUS at 09:25

## 2018-01-01 RX ADMIN — INSULIN HUMAN 3 UNITS: 100 INJECTION, SOLUTION PARENTERAL at 06:28

## 2018-01-01 RX ADMIN — LORAZEPAM 1 MG: 2 INJECTION INTRAMUSCULAR; INTRAVENOUS at 12:58

## 2018-01-01 RX ADMIN — METOPROLOL TARTRATE 25 MG: 25 TABLET ORAL at 21:24

## 2018-01-01 RX ADMIN — MORPHINE SULFATE 2 MG: 2 INJECTION, SOLUTION INTRAMUSCULAR; INTRAVENOUS at 12:58

## 2018-01-01 RX ADMIN — MORPHINE SULFATE 1 MG: 2 INJECTION, SOLUTION INTRAMUSCULAR; INTRAVENOUS at 02:38

## 2018-01-01 RX ADMIN — INSULIN HUMAN 3 UNITS: 100 INJECTION, SOLUTION PARENTERAL at 23:48

## 2018-01-01 RX ADMIN — ALTEPLASE 9 MG: KIT at 11:13

## 2018-01-01 RX ADMIN — ACETYLCYSTEINE 4 ML: 200 SOLUTION ORAL; RESPIRATORY (INHALATION) at 12:15

## 2018-01-01 RX ADMIN — MORPHINE SULFATE 1 MG: 2 INJECTION, SOLUTION INTRAMUSCULAR; INTRAVENOUS at 14:08

## 2018-01-01 RX ADMIN — LORAZEPAM 0.5 MG: 2 INJECTION INTRAMUSCULAR; INTRAVENOUS at 13:11

## 2018-01-01 RX ADMIN — METOPROLOL TARTRATE 25 MG: 25 TABLET ORAL at 08:09

## 2018-01-01 RX ADMIN — MORPHINE SULFATE 1 MG: 2 INJECTION, SOLUTION INTRAMUSCULAR; INTRAVENOUS at 12:02

## 2018-01-01 RX ADMIN — MORPHINE SULFATE 1 MG: 2 INJECTION, SOLUTION INTRAMUSCULAR; INTRAVENOUS at 05:57

## 2018-01-01 RX ADMIN — MORPHINE SULFATE 2 MG: 10 INJECTION INTRAVENOUS at 16:37

## 2018-01-01 RX ADMIN — ACETYLCYSTEINE 4 ML: 200 SOLUTION ORAL; RESPIRATORY (INHALATION) at 19:20

## 2018-01-01 RX ADMIN — ATORVASTATIN CALCIUM 80 MG: 40 TABLET, FILM COATED ORAL at 21:18

## 2018-01-01 RX ADMIN — ACETYLCYSTEINE 4 ML: 200 SOLUTION ORAL; RESPIRATORY (INHALATION) at 07:19

## 2018-01-01 RX ADMIN — SODIUM CHLORIDE 5 MG/HR: 9 INJECTION, SOLUTION INTRAVENOUS at 05:57

## 2018-01-01 RX ADMIN — LORAZEPAM 0.5 MG: 2 INJECTION INTRAMUSCULAR; INTRAVENOUS at 04:27

## 2018-01-01 RX ADMIN — ALTEPLASE 81 MG: KIT at 11:15

## 2018-01-01 RX ADMIN — SODIUM CHLORIDE 2.5 MG/HR: 9 INJECTION, SOLUTION INTRAVENOUS at 19:04

## 2018-01-01 RX ADMIN — MORPHINE SULFATE 2 MG: 2 INJECTION, SOLUTION INTRAMUSCULAR; INTRAVENOUS at 01:26

## 2018-01-01 RX ADMIN — LORAZEPAM 0.5 MG: 2 INJECTION INTRAMUSCULAR; INTRAVENOUS at 17:02

## 2018-01-01 RX ADMIN — SODIUM CHLORIDE 5 MG/HR: 9 INJECTION, SOLUTION INTRAVENOUS at 00:03

## 2018-01-01 RX ADMIN — METOPROLOL TARTRATE 5 MG: 1 INJECTION, SOLUTION INTRAVENOUS at 15:00

## 2018-01-01 RX ADMIN — LORAZEPAM 0.5 MG: 2 INJECTION INTRAMUSCULAR; INTRAVENOUS at 16:29

## 2018-01-01 RX ADMIN — TAZOBACTAM SODIUM AND PIPERACILLIN SODIUM 4.5 G: 500; 4 INJECTION, SOLUTION INTRAVENOUS at 00:08

## 2018-01-01 RX ADMIN — VANCOMYCIN HYDROCHLORIDE 2500 MG: 10 INJECTION, POWDER, LYOPHILIZED, FOR SOLUTION INTRAVENOUS at 00:08

## 2018-01-01 RX ADMIN — FAMOTIDINE 20 MG: 10 INJECTION INTRAVENOUS at 08:47

## 2018-01-01 RX ADMIN — METOPROLOL TARTRATE 5 MG: 1 INJECTION, SOLUTION INTRAVENOUS at 01:37

## 2018-01-01 RX ADMIN — POTASSIUM PHOSPHATE, MONOBASIC AND POTASSIUM PHOSPHATE, DIBASIC 45 MMOL: 224; 236 INJECTION, SOLUTION INTRAVENOUS at 13:48

## 2018-01-01 RX ADMIN — INSULIN HUMAN 2 UNITS: 100 INJECTION, SOLUTION PARENTERAL at 23:59

## 2018-01-01 RX ADMIN — FAMOTIDINE 20 MG: 10 INJECTION INTRAVENOUS at 09:59

## 2018-01-01 RX ADMIN — METOPROLOL TARTRATE 25 MG: 25 TABLET ORAL at 21:18

## 2018-01-01 RX ADMIN — DEXTROSE MONOHYDRATE 125 ML/HR: 50 INJECTION, SOLUTION INTRAVENOUS at 20:00

## 2018-01-01 RX ADMIN — ACETYLCYSTEINE 4 ML: 200 SOLUTION ORAL; RESPIRATORY (INHALATION) at 00:56

## 2018-01-01 RX ADMIN — LEVOFLOXACIN 750 MG: 5 INJECTION, SOLUTION INTRAVENOUS at 05:28

## 2018-01-01 RX ADMIN — SCOPALAMINE 1 PATCH: 1 PATCH, EXTENDED RELEASE TRANSDERMAL at 12:59

## 2018-01-01 RX ADMIN — VANCOMYCIN HYDROCHLORIDE 1250 MG: 10 INJECTION, POWDER, LYOPHILIZED, FOR SOLUTION INTRAVENOUS at 14:32

## 2018-01-01 RX ADMIN — INSULIN HUMAN 2 UNITS: 100 INJECTION, SOLUTION PARENTERAL at 18:30

## 2018-01-01 RX ADMIN — SODIUM CHLORIDE 75 ML/HR: 9 INJECTION, SOLUTION INTRAVENOUS at 05:57

## 2018-01-01 RX ADMIN — FAMOTIDINE 20 MG: 10 INJECTION INTRAVENOUS at 21:53

## 2018-01-01 RX ADMIN — MORPHINE SULFATE 2 MG: 10 INJECTION INTRAVENOUS at 10:38

## 2018-01-01 RX ADMIN — LORAZEPAM 0.5 MG: 2 INJECTION INTRAMUSCULAR; INTRAVENOUS at 21:03

## 2018-01-01 RX ADMIN — DEXTROSE MONOHYDRATE 125 ML/HR: 50 INJECTION, SOLUTION INTRAVENOUS at 23:50

## 2018-01-01 RX ADMIN — ACETAMINOPHEN 650 MG: 650 SUPPOSITORY RECTAL at 02:30

## 2018-01-01 RX ADMIN — INSULIN HUMAN 2 UNITS: 100 INJECTION, SOLUTION PARENTERAL at 17:55

## 2018-01-01 RX ADMIN — TAZOBACTAM SODIUM AND PIPERACILLIN SODIUM 4.5 G: 500; 4 INJECTION, SOLUTION INTRAVENOUS at 13:49

## 2018-01-01 RX ADMIN — MORPHINE SULFATE 2 MG: 2 INJECTION, SOLUTION INTRAMUSCULAR; INTRAVENOUS at 03:51

## 2018-01-01 RX ADMIN — ACETAMINOPHEN 649.6 MG: 650 SOLUTION ORAL at 00:07

## 2018-01-01 RX ADMIN — MORPHINE SULFATE 1 MG: 2 INJECTION, SOLUTION INTRAMUSCULAR; INTRAVENOUS at 21:36

## 2018-01-01 RX ADMIN — IPRATROPIUM BROMIDE AND ALBUTEROL SULFATE 3 ML: 2.5; .5 SOLUTION RESPIRATORY (INHALATION) at 07:19

## 2018-01-01 RX ADMIN — ATORVASTATIN CALCIUM 80 MG: 40 TABLET, FILM COATED ORAL at 21:36

## 2018-01-01 RX ADMIN — LORAZEPAM 0.5 MG: 2 INJECTION INTRAMUSCULAR; INTRAVENOUS at 09:59

## 2018-01-01 RX ADMIN — FAMOTIDINE 20 MG: 10 INJECTION INTRAVENOUS at 03:06

## 2018-01-01 RX ADMIN — METOPROLOL TARTRATE 5 MG: 1 INJECTION, SOLUTION INTRAVENOUS at 21:27

## 2018-01-01 RX ADMIN — MORPHINE SULFATE 2 MG: 10 INJECTION INTRAVENOUS at 14:58

## 2018-01-01 RX ADMIN — MORPHINE SULFATE: 1 INJECTION, SOLUTION INTRAVENOUS at 16:39

## 2018-01-01 RX ADMIN — IPRATROPIUM BROMIDE AND ALBUTEROL SULFATE 3 ML: 2.5; .5 SOLUTION RESPIRATORY (INHALATION) at 00:55

## 2018-09-26 PROBLEM — E11.9 DIABETES MELLITUS (HCC): Status: ACTIVE | Noted: 2018-01-01

## 2018-09-26 PROBLEM — G47.33 OSA (OBSTRUCTIVE SLEEP APNEA): Status: ACTIVE | Noted: 2018-01-01

## 2018-09-26 PROBLEM — I63.9 STROKE (HCC): Status: ACTIVE | Noted: 2018-01-01

## 2018-09-26 PROBLEM — I25.10 CORONARY ARTERY DISEASE: Status: ACTIVE | Noted: 2018-01-01

## 2018-09-26 PROBLEM — I63.9 CVA (CEREBRAL VASCULAR ACCIDENT) (HCC): Status: ACTIVE | Noted: 2018-01-01

## 2018-09-26 PROBLEM — K51.90 ULCERATIVE COLITIS (HCC): Status: ACTIVE | Noted: 2018-01-01

## 2018-09-26 PROBLEM — I10 HTN (HYPERTENSION): Status: ACTIVE | Noted: 2018-01-01

## 2018-09-29 PROBLEM — E78.5 HYPERLIPIDEMIA: Status: ACTIVE | Noted: 2018-01-01

## 2018-09-29 PROBLEM — K50.90 CROHN'S DISEASE (HCC): Status: ACTIVE | Noted: 2018-01-01

## 2018-09-30 PROBLEM — I48.0 PAF (PAROXYSMAL ATRIAL FIBRILLATION) (HCC): Status: ACTIVE | Noted: 2018-01-01

## 2018-10-01 NOTE — PROGRESS NOTES
Continued Stay Note  [facility]     Patient Name: Jorge Alberto Steele  MRN: [id number]  Today's Date: 10/1/2018    Admit Date: 9/26/2018          Discharge Plan     Row Name 10/01/18 0903       Plan    Plan Palliative     Plan Comments Palliative is following.  DNR.  on TF.  Comfortable on M/S.  Unresponsive.                Discharge Codes    No documentation.       Expected Discharge Date and Time     Expected Discharge Date Expected Discharge Time    Sep 30, 2018             Lonnie Alicea RN

## 2018-10-01 NOTE — PROGRESS NOTES
Adult Nutrition  Assessment/PES    Patient Name:  Jorge Alberto Steele  YOB: 1940  MRN: [id number]  Admit Date:  9/26/2018    Assessment Date:  10/1/2018    Comments:  EN held d/t abd distention; family considering comfort measures only, pt now  quadraplegic and unresponsive to deep pain.          Reason for Assessment     Row Name 10/01/18 1639          Reason for Assessment    Reason For Assessment follow-up protocol;TF/PN;per organizational policy   MDR; TF f/u     Diagnosis --   per notes of this adm     Identified At Risk by Screening Criteria tube feeding or parenteral nutrition             Nutrition/Diet History     Row Name 10/01/18 1639          Nutrition/Diet History    Factors Affecting Nutritional Intake --   EN held d/t abd distention; discussing transtion to comfort measures                         Problem/Interventions:                  Intervention Goal     Row Name 10/01/18 1640          Intervention Goal    General Meet nutritional needs for age/condition;Palliative Care     TF/PN Establish TF tolerance             Nutrition Intervention     Row Name 10/01/18 1640          Nutrition Intervention    RD/Tech Action Follow Tx progress;Care plan reviewd             Nutrition Prescription     Row Name 10/01/18 1640          Nutrition Prescription EN    Enteral Prescription --   IF resumed consider post-pyloric route             Education/Evaluation     Row Name 10/01/18 1641          Monitor/Evaluation    Monitor Per protocol;I&O;Pertinent labs;TF delivery/tolerance;Symptoms         Electronically signed by:  Bia Chavez MS,RD,LD  10/01/18 4:41 PM

## 2018-10-01 NOTE — PLAN OF CARE
Problem: Skin Injury Risk (Adult)  Goal: Identify Related Risk Factors and Signs and Symptoms  Outcome: Ongoing (interventions implemented as appropriate)      Problem: Patient Care Overview  Goal: Plan of Care Review  Outcome: Ongoing (interventions implemented as appropriate)   10/01/18 1030   Coping/Psychosocial   Plan of Care Reviewed With patient;ainsley   Plan of Care Review   Progress no change   OTHER   Outcome Summary Patient consult for wound on bridge of nose related to fall-area scabbed-covered with Xeroform-patient on BiPAP at this time-protecta-gel in place. Patient declining-WOC will sign off-please notify for any questions,concerns or support needed.

## 2018-10-01 NOTE — PLAN OF CARE
Problem: Patient Care Overview  Goal: Plan of Care Review  Outcome: Ongoing (interventions implemented as appropriate)   10/01/18 5674   Coping/Psychosocial   Plan of Care Reviewed With spouse;son   Plan of Care Review   Progress declining   OTHER   Outcome Summary discussed with family about comfort plan of care. discussed titrating morphine to help with labored breathing. awaiting to talk with neurology and intensivist before making transition to comfort.    Coping/Psychosocial   Patient Agreement with Plan of Care agrees

## 2018-10-01 NOTE — PROGRESS NOTES
Neurology       Patient Care Team:  Juancho Pena MD as PCP - General (Family Medicine)    Chief complaint: Stuporous    History:  Patient is on BiPAP with unlabored breathing.    He is getting tube feedings.    He is not moving particularly.    Seen palliative care.  Past Medical History:   Diagnosis Date   • Ankylosing spondylitis (CMS/HCC)    • Coronary artery disease    • Crohn's disease (CMS/HCC)    • Diabetes mellitus (CMS/HCC)    • Former smoker    • Hyperlipidemia    • Hypertension    • MI (myocardial infarction) 1990   • Ulcerative colitis (CMS/HCC)        Vital Signs   Vitals:    10/01/18 1000 10/01/18 1100 10/01/18 1200 10/01/18 1300   BP: 125/59 117/70 124/97 116/90   BP Location: Left arm  Left arm    Patient Position: Lying  Lying    Pulse: 113 113 111 110   Resp:   (!) 30    Temp:   99.4 °F (37.4 °C)    TempSrc:   Axillary    SpO2: 98% 96% 93% 96%   Weight:       Height:           Physical Exam:   General: Unresponsive              Neuro: Pupils equal.    Mild labored respirations.    Respiratory rate is 30.    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        Results Review:  White count 12,000 noted.    Results from last 7 days  Lab Units 10/01/18  0449   WBC 10*3/mm3 12.02*   HEMOGLOBIN g/dL 10.2*   HEMATOCRIT % 33.0*   PLATELETS 10*3/mm3 137*       Results from last 7 days  Lab Units 10/01/18  0449 09/30/18  0525 09/29/18  0410   SODIUM mmol/L 140 151* 145   POTASSIUM mmol/L 3.8 4.1 3.8   CHLORIDE mmol/L 114* 120* 119*   CO2 mmol/L 24.0 23.0 22.0   BUN mg/dL 32* 32* 27*   CREATININE mg/dL 1.46* 1.47* 1.08   CALCIUM mg/dL 7.9* 7.9* 8.0*   BILIRUBIN mg/dL 1.6*  --  1.3*   ALK PHOS U/L 65  --  86   ALT (SGPT) U/L 24  --  31   AST (SGOT) U/L 41*  --  104*   GLUCOSE mg/dL 215* 117* 151*       Imaging Results (last 24 hours)     Procedure Component Value Units Date/Time    XR Abdomen KUB [[id number]] Collected:  09/28/18 1704     Updated:  10/01/18 0928    Narrative:           EXAMINATION: XR ABDOMEN KUB - 9/28/2018     INDICATION: I63.9-Cerebral infarction, unspecified; N28.9-Disorder of  kidney and ureter, unspecified; M25.552-Pain in left hip;  R41.841-Cognitive communication deficit; R13.10-Dysphagia, unspecified;  Z74.09-Other reduced mobility.      COMPARISON: 9/26/2018.     FINDINGS: Nasogastric tube terminates within the proximal stomach.  Nonspecific, nonobstructive partially-visualized bowel gas pattern  without gross intraperitoneal free air.           Impression:       Nasogastric tube terminates within the proximal stomach.     DICTATED:   9/28/2018  EDITED/ls :   9/28/2018      This report was finalized on 10/1/2018 9:26 AM by Dr. Kemal Adams.       XR Chest 1 View [[id number]] Collected:  10/01/18 0910     Updated:  10/01/18 0922    Narrative:       EXAMINATION: XR CHEST 1 VW-10/01/2018:      INDICATION: Followup left lower lobe pneumonia; I63.9-Cerebral  infarction, unspecified; N28.9-Disorder of kidney and ureter,  unspecified; M25.552-Pain in left hip; Z74.09-Other reduced mobility;  R41.841-Cognitive communication deficit; R13.10-Dysphagia, unspecified;  Z74.09-Other reduced mobility.      COMPARISON: 09/29/2018.     FINDINGS: Portable chest reveals the patient to be status post median  sternotomy. Nasogastric tube tip at the level of the diaphragm.  Increased pulmonary vascularity seen diffusely throughout the lung  fields. Increased markings seen within the lung bases bilaterally with  small left pleural effusion which is stable and unchanged. Degenerative  changes seen within the spine.           Impression:       Stable chest as above.     D:  10/01/2018  E:  10/01/2018     This report was finalized on 10/1/2018 9:20 AM by Dr. Nany Dobbins MD.       XR Chest 1 View [[id number]] Collected:  09/30/18 1211     Updated:  10/01/18 0030    Narrative:          EXAMINATION: XR CHEST 1 VW - 9/29/2018     INDICATION: I63.9-Cerebral infarction, unspecified;  N28.9-Disorder of  kidney and ureter, unspecified; M25.552-Pain in left hip; Z74.09-Other  reduced mobility; R41.841-Cognitive communication deficit;  R13.10-Dysphagia, unspecified; Z74.09-Other reduced mobility.      COMPARISON: 9/26/2018.     FINDINGS: Heart shadow appears further enlarged. There is worsening  aeration of the left base, with fairly extensive atelectasis  silhouetting the left hemidiaphragm. Patchy disease in the right base is  stable. Upper lung interstitial changes, however, have improved, right  and left upper lobes now practically clear. No pneumothorax is seen. NG  tube is now seen in the proximal stomach.           Impression:       1. Appearance of increased cardiomegaly, but with no evidence of overt  congestive failure.   2. New, moderately extensive left lower lobe atelectasis.  3. Stable, mild right basilar interstitial changes.     DICTATED:   9/30/2018  EDITED/ls :   9/30/2018      This report was finalized on 10/1/2018 12:28 AM by DR. Joey Goddard MD.             Assessment:  To left hemisphere infarct treated with TPA, secondary hemorrhage    Subsequent right frontal infarct.    Probable aspiration pneumonia    Plan:  Spent 25 minutes reviewing the poor prognosis and the high probability of a terminal outcome in spite of all therapy.    The patient's 2 sons and wife were present.    Lid like to wait another day before making final commitment to comfort care.    Comment:  Poor prognosis         I discussed the patients findings and my recommendations with family and nursing staff    Ad Kelley MD  10/01/18  2:51 PM

## 2018-10-01 NOTE — PROGRESS NOTES
Intensivist Note     10/1/2018  Hospital Day: 5  * No surgery found *      Mr. Jorge Alberto Steele, 78 y.o. 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 There was also a history of atrial fibrillation the past for which he had been on Eliquis remotely. Family heard him fall in the bathroom 9/26/18 and when brought in by EMS was noted to have a right-sided hemiparesis, right-sided facial droop, right-sided neglect, and a right field cut. CTA revealed a 3 x 5 mm filling defect in the anterior communicating artery suspicious for thrombus. CT perfusion showed core infarct in left cerebellar hemispheres, occipital lobes, and left parietal region. Was given TPA in the ER, but unfortunately that evening had a hemorrhagic conversion in the left frontal lobe with surrounding edema. No surgical intervention was recommended or planned, just supportive care with blood pressure control.     Patient remains unresponsive. This is not surprising since MRI 9/27/18 revealed interval development of acute infarction involving the right frontal lobe, as well as the left head of putamen and caudate C/W acute infarctions. Goals of care have been discussed with the family and they wished no CPR, intubation, dialysis etc. but they wish to feed patient enterally through the weekend and reassess on Monday. Patient was on CPAP, but because of increased ventilatory difficulties was switched to BiPAP. On BiPAP he  "continues to ventilate easily with adequate tidal volumes of approximately 600 mL. FiO2 is presently set at 50% but his oxygen saturations are 98% thus can be weaned. He continues to have difficulty clearing his secretions due to poor cough. He improves intermittently with suctioning but subsequently will deteriorate without warning (mucous plugging). Remains stuporous/obtunded. He moans, but is totally unresponsive on exam. Morphine drip was added 9/29/18 for comfort but remains on a minimal dose     Patient spiked a temp to 102 the evening of 9/29/18. Was already taking Zosyn, so vancomycin was added to cover for resistant staph. Sputum culture from 9/30/18 is growing a staph species but it is not aureus. Patient was incontinent and since his renal function was worse and he was hyponatremic, on 9/30/18 a Gardiner catheter was placed. Urine output since placement of Gardiner 9/30/18 has only been 460 mL (despite a total intake of 4216 mL).     Patient's family indicated that he is to be a DO NOT RESUSCITATE/DO NOT INTUBATE. Neurology however recommended continuing enteral tube feeding through the weekend and making a decision today regarding comfort care and moving to a palliative care bed or hospice unit for continuing therapy as we are doing. Palliative care is following the patient as well.    The patient's relevant past medical, surgical and social history were reviewed and updated in Epic as appropriate.    OBJECTIVE     /97 (BP Location: Left arm, Patient Position: Lying)   Pulse 111   Temp 99.4 °F (37.4 °C) (Axillary)   Resp (!) 30   Ht 182.9 cm (72.01\")   Wt 111 kg (244 lb)   SpO2 93%   BMI 33.08 kg/m²   Oxygen Concentration (%): 40      Flowsheet Rows      First Filed Value   Admission Height  182.9 cm (72\") Documented at 09/26/2018 1017   Admission Weight  111 kg (244 lb 11.4 oz) Documented at 09/26/2018 1042        Intake & Output (last day)       09/30 0701 - 10/01 0700 10/01 0701 - 10/02 0700    " I.V. (mL/kg) 1764.7 (15.9)     Other 651 120    NG/GT 1000 258    IV Piggyback 800     Total Intake(mL/kg) 4215.7 (38) 378 (3.4)    Urine (mL/kg/hr) 460 (0.2) 350 (0.4)    Total Output 460 350    Net +3755.7 +28                Exam:  General Exam:  Oculi ill white male with a BiPAP mask in place. Continues to intermittently moan. Respiratory rate  HEENT: Pupils equal and reactive. Nose and throat clear although oral mucosa dry due to BiPAP mask  Neck:                          Supple, no JVD, thyromegaly, or adenopathy  Lungs: Bilateral rhonchi and somewhat diminished on the left  Cardiovascular: Bigeminal rhythm (atrial bigeminy and 90 on monitor)  Abdomen: Soft nontender without organomegaly or masses. Obese   and rectal: Deferred.  Extremities: No cyanosis or clubbing but 1+ lower extremity edema.  Neurologic:                 Unresponsive except for withdrawal from pain    Chest X-Ray 10/1/18: Mild cardiomegaly. Diffusely increased congestive changes with some suspected small bibasilar pleural effusions left greater than right. Previous median sternotomy no real change since yesterday's film    INFUSIONS  Morphine     niCARdipine 5-15 mg/hr Last Rate: 2.5 mg/hr (09/29/18 1904)         Results from last 7 days  Lab Units 10/01/18  0449 09/30/18  0525 09/29/18  0410   WBC 10*3/mm3 12.02* 16.22* 13.30*   HEMOGLOBIN g/dL 10.2* 11.4* 11.5*   HEMATOCRIT % 33.0* 36.5* 36.5*   PLATELETS 10*3/mm3 137* 138* 143*       Results from last 7 days  Lab Units 10/01/18  0449 09/30/18  0525   SODIUM mmol/L 140 151*   POTASSIUM mmol/L 3.8 4.1   CHLORIDE mmol/L 114* 120*   CO2 mmol/L 24.0 23.0   BUN mg/dL 32* 32*   CREATININE mg/dL 1.46* 1.47*   GLUCOSE mg/dL 215* 117*   CALCIUM mg/dL 7.9* 7.9*       Results from last 7 days  Lab Units 10/01/18  0449 09/30/18  0525 09/29/18  0410   MAGNESIUM mg/dL 2.1 2.0 2.2  2.2   PHOSPHORUS mg/dL 1.2* 2.5 2.2*  2.2*       Results from last 7 days  Lab Units 10/01/18  0449 09/29/18  0410   ALK  PHOS U/L 65 86   BILIRUBIN mg/dL 1.6* 1.3*   ALT (SGPT) U/L 24 31   AST (SGOT) U/L 41* 104*       No results found for: SEDRATE  No results found for: BNP  Lab Results   Component Value Date    TROPONINI 2.410 (C) 09/27/2018     No results found for: TSH  Lab Results   Component Value Date    LACTATE 1.8 09/26/2018     No results found for: CORTISOL        Mode: BiPAP S/T (10/01/18 0027)    Resp Rate (Set): 10 (10/01/18 0027) Resp Rate (Observed) Vent: 30 (10/01/18 1200)   FiO2 (%): 50 % (10/01/18 0027)    PEEP/CPAP (cm H2O): 10 cm H20 (09/30/18 1217)      Minute Ventilation (L/min) (Obs): 10.1 L/min (09/30/18 1543)        I reviewed the patient's results, images and medication.    Assessment/Plan   ASSESSMENT      Principal Problem:    CVA s/p TPA with hemorrhagic conversion 9/26/18 (CMS/[facility])  Active Problems:    HTN (hypertension)    Diabetes mellitus (CMS/[facility])    Hyperlipidemia    Coronary artery disease with history of CABG    PAF (paroxysmal atrial fibrillation) (CMS/[facility])    KEISHA (obstructive sleep apnea), CPAP intolerant    Ulcerative colitis (CMS/[facility])    Crohn's disease (CMS/[facility])      DISCUSSION: Obviously not improve over the weekend. Will discuss again with neurology and family what their plans are as I expect he will deteriorate rapidly. I would prefer to get him off BiPAP which is uncomfortable and focus on keeping him more comfortable with meds. Would also recommend discontinuing enteral feeds. There apparently has been no change or improvement over the weekend and that was the only recent he received tube feedings in the first place.    PLAN     1. Discuss situation with neurology.  2. Discuss situation with family  3. Would like palliative care to talk with family again after I do for a final decision as to where we go from here    Plan of care and goals reviewed with mulitdisciplinary team at daily rounds.    I discussed the patient's findings and my recommendations with family and consulting  provider    Time spent Critical care 35 min (It does not include procedure time).    Shaji Dunn MD  Intensive Care Medicine  10/01/18 2:33 PM

## 2018-10-01 NOTE — PLAN OF CARE
Problem: Patient Care Overview  Goal: Plan of Care Review  Outcome: Ongoing (interventions implemented as appropriate)   10/01/18 1700   Coping/Psychosocial   Plan of Care Reviewed With spouse;son   Plan of Care Review   Progress declining   OTHER   Outcome Summary co visit with Charis Jung LCSW to discuss plan/goals. Spoke to pt's wife, sons  and Zay. Family moving towards comfort plan of care. will d/c tube feeding , iv fluids and noncomfort meds, continue bipap for now until family ready to discontinue and breathing not labored and tachypneic. Titrate morphine infusion for labored breathing, intermittent moaning/pain and tachycardia.  Spoke to Esther VANEGAS to adjust meds and comfort orders. Will reassess tomorrow. Please call oncall palliative care for issues of comfort or distress. [phone]   Coping/Psychosocial   Patient Agreement with Plan of Care agrees

## 2018-10-01 NOTE — PLAN OF CARE
Problem: Patient Care Overview  Goal: Plan of Care Review  Outcome: Ongoing (interventions implemented as appropriate)   10/01/18 1830   Coping/Psychosocial   Plan of Care Reviewed With spouse;son   Plan of Care Review   Progress declining   OTHER   Outcome Summary PT becomes tachycardic with any stimulation including turning and suction, all care at this point has been withdrawn, Morphine has been increased to 0.5, Family plans to remove bi-pap after more family arrives on wednesday 10/2/18. will continue to monitor       Problem: Stroke (Hemorrhagic) (Adult)  Intervention: Support Psychosocial Response to Stroke   10/01/18 1600 10/01/18 1800 10/01/18 1830   Coping/Psychosocial Interventions   Supportive Measures --  --  active listening utilized   Environmental Support calm environment promoted;caregiver consistency promoted --  --    Psychosocial Support   Family/Support System Care --  involvement promoted;presence promoted --          Problem: Palliative Care (Adult)  Goal: Maximized Comfort  Outcome: Ongoing (interventions implemented as appropriate)   09/30/18 0533   Palliative Care (Adult)   Maximized Comfort achieves outcome     Goal: Enhanced Quality of Life  Outcome: Ongoing (interventions implemented as appropriate)   10/01/18 1830   Palliative Care (Adult)   Enhanced Quality of Life achieves outcome

## 2018-10-01 NOTE — PLAN OF CARE
Problem: Palliative Care (Adult)  Intervention: Promote Informed Decision Making and Goal Setting   10/01/18 1712   Coping/Psychosocial Interventions   Life Transition/Adjustment decision-making facilitated;end-of-life care initiated   Discussion at bedside - Ingris Good RN, patient's wife and two sons Luke - family would like to begin to focus on patient comfort in end of life - they are not ready to d/c bipap at this time but are agreeable to d/c tube feeds, fluids, and increase comfort medications for dyspnea and pain.  They understand patient is fragile and could rapidly decline, we will address d/c of bipap tomorrow (but they are aware they can d/c sooner if they should wish).  They are reaching out to patient's siblings and would like to allow them contact prior to d/c of bipap. Catherine VANEGAS aware and will discuss changes and orders with patient's RN.  Intervention: Support/Optimize Psychosocial Response   10/01/18 4864   Coping/Psychosocial Interventions   Supportive Measures active listening utilized;counseling provided;decision-making supported   Psychosocial Support   Family/Support System Care support provided;self-care encouraged

## 2018-10-01 NOTE — PROGRESS NOTES
Pharmacokinetic Consult - Vancomycin Dosing    Jorge Alberto Steele is a 78 y.o. male who has been consulted for vancomycin dosing for pneumonia .    Current Antimicrobial Therapy:  Levaquin 750mg IV q24h - Day 2  Zosyn 4.5 mg q8h - Day 2  Vancomycin - Day 2    Relevant clinical data and objective history reviewed:  Lab Results   Component Value Date/Time    CREATININE 1.46 (H) 10/01/2018 04:49 AM    CREATININE 1.47 (H) 09/30/2018 05:25 AM    CREATININE 1.08 09/29/2018 04:10 AM    CREATININE 1.60 (H) 09/26/2018 10:26 AM    BUN 32 (H) 10/01/2018 04:49 AM    BUN 32 (H) 09/30/2018 05:25 AM    BUN 27 (H) 09/29/2018 04:10 AM     Estimated Creatinine Clearance: 54.5 mL/min (A) (by C-G formula based on SCr of 1.46 mg/dL (H)).  I/O last 3 completed shifts:  In: 5413.7 [I.V.:1962.7; Other:811; NG/GT:1240; IV Piggyback:1400]  Out: 460 [Urine:460]  Lab Results   Component Value Date/Time    WBC 12.02 (H) 10/01/2018 04:49 AM    HGB 10.2 (L) 10/01/2018 04:49 AM    HCT 33.0 (L) 10/01/2018 04:49 AM    .5 (H) 10/01/2018 04:49 AM     (L) 10/01/2018 04:49 AM     Temp Readings from Last 3 Encounters:   10/01/18 99.4 °F (37.4 °C) (Axillary)     Weight:  (244 lb; Ht 72 in)  Baseline culture/source/susceptibility: Pending    Assessment/Plan  Vancomycin trough slightly supratherapeutic at 23.3, drawn appropriately ~12 hours after dose.     1. Will decrease dose to Vancomycin 1000 mg IV q24h. Target vancomycin trough 15-20 ug/mL.  2. Continue to monitor renal function and draw vancomycin levels as appropriate based on goals of therapy.   3. Pharmacy will continue to follow the patient’s culture results and clinical progress daily.    Eleonora Deleon, PharmD Candidate 2019  Pharmacy will continue to monitor and make adjustments to dose as necessary based on renal function, cultures, labs, and clinical status.     Thank you for this consult.  Elisabeth Badillo PharmD, BCPS  10/1/2018  3:06 PM

## 2018-10-02 NOTE — DISCHARGE SUMMARY
"Death Summary    Patient name: Jorge Alberto Steele  CSN: [id number]  MRN: [id number]  : 1940  Today's date: 10/2/2018     Date of Admission: 2018  Date and Time of Death:  10/2/2018 AT 1658    Admitting Physician: KAREN Robles DO  Primary Care Provider: Juancho Pena MD  Consultations:   Dr. Kelley-Neurology    Diagnoses at the Time of Death:   Principal Problem:    CVA s/p TPA with hemorrhagic conversion 18 (CMS/[facility])  Active Problems:    HTN (hypertension)    KIESHA (obstructive sleep apnea), CPAP intolerant    Ulcerative colitis (CMS/HCC)    Diabetes mellitus (CMS/[facility])    Coronary artery disease with history of CABG    Hyperlipidemia    Crohn's disease (CMS/HCC)    PAF (paroxysmal atrial fibrillation) (CMS/[facility])      Procedures:  None       History of Present Illness:  Mr. Steele is a 78 year old male with PMH ulcerative colitis, Crohn's Disease, ankylosing spondylitis, CAD, HTN, DM, who was last known normal at 0900 on 18. He was in the bathroom and the family heard a \"thud\" and found him lying on the bathroom floor with right sided hemiparesis, right sided facial droop, right sided neglect and complaining of left hip pain. His son states that the patient was wedged between the wall and toilet and it took approximately 30 minutes for EMS to free him. He was then transported to our ED.      In the ED his initial NIH was 18 and CT scan of the head without contrast showed no hemorrhage. CTA showed a filling defect in the SHIRA region, suspicious for a thrombus. CTA of the neck showed 50% stenosis proximal right ICA. CT perfusion showed core infarct in the left cerebellar hemispheres, occipital lobes, and left parietal region, consistent with core infarct.  tPA was administered in the ED and he was transferred to the neuro ICU.      He complained of left hip pain but x-ray of the left hip and pelvis were negative for fracture.     Hospital Course:  Admitted to ICU as discussed in history present " illness.  At 2200 hrs. on day of admission the patient exhibited change in neuro status and her went CT scan with contrast.  Results of this showed a 4.4 cm intraparenchymal hemorrhage.  Neuro interventional list were notified and aggressive blood pressure control recommended.  There was no surgical intervention possible at that time.  On hospital day #1 the patient was seen in consult by neurology.  Anticoagulants were held and he was given a guarded prognosis at the time due to his left frontal lobe hemorrhage post TPA.  Later that day discussion with the family regarding his prognosis occurred and they requested that the patient be made DNR/DNI.  At that time Palliative Care medicine was asked to see the patient.  Patient was placed on CPAP, tube feeds were started, and goals of care were continued to be followed with palliative medicine.  On day #3 the patient developed intermittent episodes of atrial fibrillation which was amenable to metoprolol IV.  Subsequently the patient developed a temperature of 102° requiring BiPAP FiO2 to be increased to 100%.  Empiric antimicrobials were then started.  By hospital day #6 the patient's Shirley Coma Score continued to drop.  Palliative medicine as well as neurology continued to have discussions with the patient's family noting poor prognosis.  Subsequently the family was in agreement for medication changes in anticipation of removing his BiPAP.  Patient was made comfort care only and at 1658 on 10/2/18 he was declared .    ROMINA Thomas, [facility]-BC  Pulmonary & Critical Care Medicine    Shaji Dunn MD  Pulmonary and Critical Care Medicine

## 2018-10-02 NOTE — PLAN OF CARE
Problem: Patient Care Overview  Goal: Plan of Care Review  Outcome: Ongoing (interventions implemented as appropriate)   10/02/18 1650   Coping/Psychosocial   Plan of Care Reviewed With son   Plan of Care Review   Progress declining   OTHER   Outcome Summary pt is actively dying. tachypneic at times. 2mg prn morphine given with some relief of labored breathing. morphine infusion increased to 2mg per hr. Pt has some congestion. scop patch in place. pt is relaxed and appears comfortable. cyanotic and sats in high 60's to 70. support to ainsley Collazo at the bedside. life review.    Coping/Psychosocial   Patient Agreement with Plan of Care agrees

## 2018-10-02 NOTE — PROGRESS NOTES
"Palliative Care Progress Note    Date of Admission: 9/26/2018    Code Status:   Current Code Status     Date Active Code Status Order ID Comments User Context       9/27/2018 11:03 AM No CPR [id number]  Case, Faith GEE, DO Inpatient       Questions for Current Code Status     Question Answer Comment    Code Status No CPR     Medical Interventions (Level of Support Prior to Arrest) Limited     Limited Support to NOT Include Dialysis      Intubation     Level Of Support Discussed With Next of Kin (If No Surrogate)         Advance Directive: reviewed on medical record  Surrogate decision maker: wife, Eloisa.    Two sons,  and Zay, are involved and making consensual decision with the spouse    Subjective:  Unresponsive, continues to have difficulty handling secretions.   Remained on NIPPV, unable to tolerate removal.  Prn  - x 3 lorazepam    Reviewed current scheduled and prn medications for route, type, dose, and frequency.    Morphine     niCARdipine 5-15 mg/hr Last Rate: 2.5 mg/hr (09/29/18 1904)   Pharmacy Consult       •  acetaminophen  •  acetaminophen **OR** [DISCONTINUED] acetaminophen  •  bisacodyl  •  dextrose  •  dextrose  •  glucagon (human recombinant)  •  LORazepam  •  Morphine  •  Morphine  •  naloxone  •  Pharmacy Consult  •  sodium chloride  •  sodium chloride    Objective:  PPS 20%   BP 94/62   Pulse 112   Temp 99.4 °F (37.4 °C) (Axillary)   Resp 26   Ht 182.9 cm (72.01\")   Wt 111 kg (244 lb)   SpO2 93%   BMI 33.08 kg/m²    Intake & Output (last day)       10/01 0701 - 10/02 0700    I.V. (mL/kg) 2.4 (0)    Other 320    NG/    IV Piggyback 85    Total Intake(mL/kg) 904.4 (8.1)    Urine (mL/kg/hr) 765 (0.5)    Total Output 765    Net +139.4             Lab Results (last 24 hours)     Procedure Component Value Units Date/Time    Vancomycin, Trough [[id number]]  (Abnormal) Collected:  10/01/18 1357    Specimen:  Blood Updated:  10/01/18 1429     Vancomycin Trough 23.30 (H) mcg/mL     " Prealbumin [[id number]]  (Abnormal) Collected:  10/01/18 1357    Specimen:  Blood Updated:  10/01/18 1428     Prealbumin <5.0 (L) mg/dL     POC Glucose Once [[id number]]  (Abnormal) Collected:  10/01/18 1212    Specimen:  Blood Updated:  10/01/18 1235     Glucose 193 (H) mg/dL     Narrative:       Meter: [id number] : [id number] Alderman Dawkins    Blood Culture - Blood, [[id number]]  (Abnormal) Collected:  09/30/18 0000    Specimen:  Blood from Arm, Left Updated:  10/01/18 1016     Blood Culture Staphylococcus, coagulase negative (A)     Isolated from Aerobic and Anaerobic Bottles     Gram Stain Result Anaerobic Bottle Gram positive cocci in groups      Aerobic Bottle Gram positive cocci in groups    POC Glucose Once [[id number]]  (Abnormal) Collected:  10/01/18 0625    Specimen:  Blood Updated:  10/01/18 0626     Glucose 210 (H) mg/dL     Narrative:       Meter: [id number] : [id number] Britany Domingo    CBC & Differential [[id number]] Collected:  10/01/18 0449    Specimen:  Blood Updated:  10/01/18 0614    Narrative:       The following orders were created for panel order CBC & Differential.  Procedure                               Abnormality         Status                     ---------                               -----------         ------                     CBC Auto Differential[[id number]]        Abnormal            Final result                 Please view results for these tests on the individual orders.    CBC Auto Differential [[id number]]  (Abnormal) Collected:  10/01/18 0449    Specimen:  Blood Updated:  10/01/18 0614     WBC 12.02 (H) 10*3/mm3      RBC 3.22 (L) 10*6/mm3      Hemoglobin 10.2 (L) g/dL      Hematocrit 33.0 (L) %      .5 (H) fL      MCH 31.7 (H) pg      MCHC 30.9 (L) g/dL      RDW 15.4 (H) %      RDW-SD 58.1 (H) fl      MPV 10.5 fL      Platelets 137 (L) 10*3/mm3      Neutrophil % 83.2 (H) %      Lymphocyte % 9.2 (L) %      Monocyte % 5.0 %      Eosinophil % 1.9 %      Basophil %  0.2 %      Immature Grans % 0.5 %      Neutrophils, Absolute 10.00 (H) 10*3/mm3      Lymphocytes, Absolute 1.11 10*3/mm3      Monocytes, Absolute 0.60 10*3/mm3      Eosinophils, Absolute 0.23 10*3/mm3      Basophils, Absolute 0.02 10*3/mm3      Immature Grans, Absolute 0.06 (H) 10*3/mm3     Comprehensive Metabolic Panel [[id number]]  (Abnormal) Collected:  10/01/18 0449    Specimen:  Blood Updated:  10/01/18 0555     Glucose 215 (H) mg/dL      BUN 32 (H) mg/dL      Creatinine 1.46 (H) mg/dL      Sodium 140 mmol/L      Potassium 3.8 mmol/L      Chloride 114 (H) mmol/L      CO2 24.0 mmol/L      Calcium 7.9 (L) mg/dL      Total Protein 5.3 (L) g/dL      Albumin 2.87 (L) g/dL      ALT (SGPT) 24 U/L      AST (SGOT) 41 (H) U/L      Alkaline Phosphatase 65 U/L      Total Bilirubin 1.6 (H) mg/dL      eGFR Non African Amer 47 (L) mL/min/1.73      Globulin 2.4 gm/dL      A/G Ratio 1.2 (L) g/dL      BUN/Creatinine Ratio 21.9     Anion Gap 2.0 (L) mmol/L     Narrative:       [employer] Guidelines    Stage     Description        GFR  1         Normal or High     90+  2         Mild decrease      60-89  3         Moderate decrease  30-59  4         Severe decrease    15-29  5         Kidney failure     <15    The MDRD GFR formula is only valid for adults with stable renal function between ages 18 and 70.    Magnesium [[id number]]  (Normal) Collected:  10/01/18 0449    Specimen:  Blood Updated:  10/01/18 0554     Magnesium 2.1 mg/dL     Phosphorus [[id number]]  (Abnormal) Collected:  10/01/18 0449    Specimen:  Blood Updated:  10/01/18 0554     Phosphorus 1.2 (L) mg/dL     Blood Culture - Blood, [[id number]]  (Normal) Collected:  09/30/18 0020    Specimen:  Blood from Arm, Left Updated:  10/01/18 0130     Blood Culture No growth at 24 hours    POC Glucose Once [[id number]]  (Abnormal) Collected:  09/30/18 2338    Specimen:  Blood Updated:  09/30/18 2340     Glucose 218 (H) mg/dL     Narrative:       Meter: [id number]  : [id number] Matthew Pearce        Imaging Results (last 24 hours)     Procedure Component Value Units Date/Time    XR Abdomen KUB [[id number]] Collected:  09/28/18 1704     Updated:  10/01/18 0928    Narrative:          EXAMINATION: XR ABDOMEN KUB - 9/28/2018     INDICATION: I63.9-Cerebral infarction, unspecified; N28.9-Disorder of  kidney and ureter, unspecified; M25.552-Pain in left hip;  R41.841-Cognitive communication deficit; R13.10-Dysphagia, unspecified;  Z74.09-Other reduced mobility.      COMPARISON: 9/26/2018.     FINDINGS: Nasogastric tube terminates within the proximal stomach.  Nonspecific, nonobstructive partially-visualized bowel gas pattern  without gross intraperitoneal free air.           Impression:       Nasogastric tube terminates within the proximal stomach.     DICTATED:   9/28/2018  EDITED/ls :   9/28/2018      This report was finalized on 10/1/2018 9:26 AM by Dr. Kemal Adams.       XR Chest 1 View [[id number]] Collected:  10/01/18 0910     Updated:  10/01/18 0922    Narrative:       EXAMINATION: XR CHEST 1 VW-10/01/2018:      INDICATION: Followup left lower lobe pneumonia; I63.9-Cerebral  infarction, unspecified; N28.9-Disorder of kidney and ureter,  unspecified; M25.552-Pain in left hip; Z74.09-Other reduced mobility;  R41.841-Cognitive communication deficit; R13.10-Dysphagia, unspecified;  Z74.09-Other reduced mobility.      COMPARISON: 09/29/2018.     FINDINGS: Portable chest reveals the patient to be status post median  sternotomy. Nasogastric tube tip at the level of the diaphragm.  Increased pulmonary vascularity seen diffusely throughout the lung  fields. Increased markings seen within the lung bases bilaterally with  small left pleural effusion which is stable and unchanged. Degenerative  changes seen within the spine.           Impression:       Stable chest as above.     D:  10/01/2018  E:  10/01/2018     This report was finalized on 10/1/2018 9:20 AM by Dr. Ayon  MD Mu.       XR Chest 1 View [[id number]] Collected:  09/30/18 1211     Updated:  10/01/18 0030    Narrative:          EXAMINATION: XR CHEST 1 VW - 9/29/2018     INDICATION: I63.9-Cerebral infarction, unspecified; N28.9-Disorder of  kidney and ureter, unspecified; M25.552-Pain in left hip; Z74.09-Other  reduced mobility; R41.841-Cognitive communication deficit;  R13.10-Dysphagia, unspecified; Z74.09-Other reduced mobility.      COMPARISON: 9/26/2018.     FINDINGS: Heart shadow appears further enlarged. There is worsening  aeration of the left base, with fairly extensive atelectasis  silhouetting the left hemidiaphragm. Patchy disease in the right base is  stable. Upper lung interstitial changes, however, have improved, right  and left upper lobes now practically clear. No pneumothorax is seen. NG  tube is now seen in the proximal stomach.           Impression:       1. Appearance of increased cardiomegaly, but with no evidence of overt  congestive failure.   2. New, moderately extensive left lower lobe atelectasis.  3. Stable, mild right basilar interstitial changes.     DICTATED:   9/30/2018  EDITED/ls :   9/30/2018      This report was finalized on 10/1/2018 12:28 AM by DR. Joey Goddard MD.             Physical Exam:  Gen: NAD, resting in bed, unresponsive  Skin: warm, dry   Resp/thorax: even effort, non labored, on BiPAP  CV: RRR   ABD: soft, distended  : soriano to bedside drainage   Ext: no edema, no redness  Neuro: unresponsive, no myoclonus       Reviewed labs and diagnostic results.   Lab Results   Component Value Date    HGBA1C 6.20 (H) 09/27/2018       Results from last 7 days  Lab Units 10/01/18  0449   WBC 10*3/mm3 12.02*   HEMOGLOBIN g/dL 10.2*   HEMATOCRIT % 33.0*   PLATELETS 10*3/mm3 137*       Results from last 7 days  Lab Units 10/01/18  0449   SODIUM mmol/L 140   POTASSIUM mmol/L 3.8   CHLORIDE mmol/L 114*   CO2 mmol/L 24.0   BUN mg/dL 32*   CREATININE mg/dL 1.46*   CALCIUM mg/dL 7.9*    BILIRUBIN mg/dL 1.6*   ALK PHOS U/L 65   ALT (SGPT) U/L 24   AST (SGOT) U/L 41*   GLUCOSE mg/dL 215*       Impression: 78 y.o. male with acute Left SHIRA ischemic stroke and left frontal ICH hemorrhagic conversion, ankylosing spondylitis    Plan:   Dyspnea - continue morphine infusion.   Noted after family discussion, increased to 0.5mg/hour continue morphine prns.   Continue BiPAP support    Restlessness - scheduled ativan with prn ativan    Goals of care -   Initial visit - son, Zay, at bedside. Reports that his mother and brother will be coming to the hospital soon.  Family is wanting to have further discussion with Dr Kelley and Dr Dunn.   After those discussion, palliative medicine , Emilia Jung, and palliative nurse, Ingris Good, met with the family.   Family made the decision to start transition to more comfort focused plan of care with stopping artificial nutrition, hydration and antibiotics.   Patient's siblings are planning to visit, family will withdraw BiPAP after their visit.       Time: 30 minutes       Esther Bah, APRN  [phone]  10/01/18  8:37 PM

## 2018-10-02 NOTE — PROGRESS NOTES
Neurology       Patient Care Team:  Juancho Pena MD as PCP - General (Family Medicine)    Chief complaint: Stroke    History:  Less responsive today.    La Coma Scale is dropped to 4 today versus 8 yesterday.      Past Medical History:   Diagnosis Date   • Ankylosing spondylitis (CMS/HCC)    • Coronary artery disease    • Crohn's disease (CMS/HCC)    • Diabetes mellitus (CMS/HCC)    • Former smoker    • Hyperlipidemia    • Hypertension    • MI (myocardial infarction) 1990   • Ulcerative colitis (CMS/HCC)        Vital Signs   Vitals:    10/02/18 0330 10/02/18 0400 10/02/18 0430 10/02/18 0600   BP: 110/75 134/59 110/55    BP Location:  Left arm     Patient Position:  Lying     Pulse: 102 96 100 101   Resp:  28  28   Temp:  100.1 °F (37.8 °C)     TempSrc:  Axillary     SpO2: 95% 95% 94% 93%   Weight:       Height:           Physical Exam:   General: Stuporous    Labored respirations on BiPAP.  Respiratory rate is 24.    Temperature of 100.1.                  Neuro: Unresponsive.    Pupils 2 mm and nonreactive.    Eyes conjugate.    Flaccid with no voluntary movements.    Areflexic.    No withdrawal to deep pain.        Results Review:  Reviewed    Results from last 7 days  Lab Units 10/01/18  0449   WBC 10*3/mm3 12.02*   HEMOGLOBIN g/dL 10.2*   HEMATOCRIT % 33.0*   PLATELETS 10*3/mm3 137*       Results from last 7 days  Lab Units 10/01/18  0449 09/30/18  0525 09/29/18  0410   SODIUM mmol/L 140 151* 145   POTASSIUM mmol/L 3.8 4.1 3.8   CHLORIDE mmol/L 114* 120* 119*   CO2 mmol/L 24.0 23.0 22.0   BUN mg/dL 32* 32* 27*   CREATININE mg/dL 1.46* 1.47* 1.08   CALCIUM mg/dL 7.9* 7.9* 8.0*   BILIRUBIN mg/dL 1.6*  --  1.3*   ALK PHOS U/L 65  --  86   ALT (SGPT) U/L 24  --  31   AST (SGOT) U/L 41*  --  104*   GLUCOSE mg/dL 215* 117* 151*       Imaging Results (last 24 hours)     ** No results found for the last 24 hours. **          Assessment:  Acute left MCA stroke with hemorrhagic conversion post-TPA.    New  right frontal acute infarct.        Plan:  Poor prognosis for survival was discussed with the patient's wife and son.    Comment:  Patient continues to decline.         I discussed the patients findings and my recommendations with family and nursing staff    Ad Kelley MD  10/02/18  10:13 AM

## 2018-10-02 NOTE — PLAN OF CARE
Problem: Patient Care Overview  Goal: Interprofessional Rounds/Family Conf  Outcome: Ongoing (interventions implemented as appropriate)  Palliative Team Attendance 1300. Ronaldo MOORE, Jose Gauthier RN CHPN, Charis VILLARREALW, Tl Flores MDIV, Nay Ontiveros RN , CHPN, Betsy Wolfe RN, CHPN, Domenica Fontana RN    10/02/18 1300   Interdisciplinary Rounds/Family Conf   Summary pt is comfort plan of care. morphine infusion titrated to 1mg per hr for labored breathing. plans to remove bipap. continue support to family

## 2018-10-02 NOTE — PLAN OF CARE
Problem: Palliative Care (Adult)  Intervention: Support/Optimize Psychosocial Response   10/02/18 8495   Coping/Psychosocial Interventions   Supportive Measures active listening utilized   Psychosocial Support   Family/Support System Care support provided;self-care encouraged;caregiver stress acknowledged   Discussion at bedside with patient's wife and two sons.  Patient appears to be resting calmly and without agitation restlessness, on bipap with morphine infusion.  Discussed end of life care, comfort meds available for symptoms - family wrestling with taking the bipap off today vs tomorrow so patient's sister can visit before he dies.  Also discussed that patient is fragile and could rapidly decline before bipap removed as they plan.  All receptive to information, supportive presence, active listening and exploration of concerns and worries.  Esther Bah APRN to see this morning and answer questions as possible.

## 2018-10-02 NOTE — PROGRESS NOTES
Intensivist Note     10/2/2018  Hospital Day: 6  * No surgery found *      Mr. Jorge Alberto Steele, 78 y.o. male is followed for:  Principal Problem:    CVA s/p TPA with hemorrhagic conversion 9/26/18 (CMS/[facility])  Active Problems:    HTN (hypertension)    Diabetes mellitus (CMS/HCC)    Hyperlipidemia    Coronary artery disease with history of CABG    PAF (paroxysmal atrial fibrillation) (CMS/HCC)    KEISHA (obstructive sleep apnea), CPAP intolerant    Ulcerative colitis (CMS/HCC)    Crohn's disease (CMS/[facility])       SUBJECTIVE     78-year-old white male with history of CAD status post CABG, hypertension, diabetes mellitus, ankylosing spondylitis, Crohn's disease, and ulcerative colitis. There was also a history of atrial fibrillation in the past for which he had been on Eliquis remotely. Family heard him fall in the bathroom 9/26/18 and when brought in by EMS was noted to have right-sided hemiparesis, right-sided facial droop, right-sided neglect, and a right field cut. CTA revealed a 3 x 5 mm filling defect in the anterior communicating artery suspicious for thrombus. CT perfusion showed core infarct in left cerebellar hemispheres, occipital lobes, and left parietal region. Was given TPA in the ER, but unfortunately that evening developed hemorrhagic conversion in the left frontal lobe with surrounding edema. No surgical intervention was recommended or planned, just supportive care with blood pressure control.     Patient has progressively declined which is not surprising since MRI 9/27/18 revealed interval development of acute infarction involving the right frontal lobe, as well as the left head of putamen and caudate C/W acute infarctions. G OC were discussed with the family and they wished no CPR, intubation, dialysis etc. but they wish to feed patient enterally through the weekend and reassess 10/1/18. Patient was on CPAP, but because of increased ventilatory difficulties was switched to BiPAP. On BiPAP he continues to  "ventilate easily, but over the last 2 days his tidal volumes have dropped from 600 mL into the mid 300 mL with an associated increase in his respiratory rate. In addition FiO2 requirement increased to 60% over the last 24 hours. He continues to have difficulty clearing his secretions due to poor cough. Scopolamine patch has been started     Patient spiked a temp to 102 the evening of 9/29/18. Was already taking Zosyn, so vancomycin was added to cover for resistant staph. Blood culture from 9/30/18 grew coag-negative staph which is probably a contaminant. Patient was incontinent and since his renal function was worse and he was hyponatremic, on 9/30/18 a Gardiner catheter was placed. Urine output had declined but picked up over the last 24 hours and he has put out 1430 mL.      Patient is even less responsive today and does not respond to painful stimuli. He is no longer moaning, his tidal volumes have decreased, and his FiO2 requirement has increased. Because of this the patient's family agrees with removing BiPAP and focusing only on comfort. Tube feedings have also been discontinued as have antibiotics.        The patient's relevant past medical, surgical and social history were reviewed and updated in Epic as appropriate.    OBJECTIVE     /70 (BP Location: Left arm, Patient Position: Lying)   Pulse 116   Temp 98 °F (36.7 °C) (Axillary)   Resp 26   Ht 182.9 cm (72.01\")   Wt 111 kg (244 lb)   SpO2 94%   BMI 33.08 kg/m²   Oxygen Concentration (%): 60      Flowsheet Rows      First Filed Value   Admission Height  182.9 cm (72\") Documented at 09/26/2018 1017   Admission Weight  111 kg (244 lb 11.4 oz) Documented at 09/26/2018 1042        Intake & Output (last day)       10/01 0701 - 10/02 0700 10/02 0701 - 10/03 0700    I.V. (mL/kg) 6.7 (0.1)     Other 320     NG/     IV Piggyback 85     Total Intake(mL/kg) 908.7 (8.2)     Urine (mL/kg/hr) 1430 (0.5) 395 (0.4)    Total Output 1430 395    Net -521.3 " -395          Unmeasured Urine Occurrence 1 x           Exam:  General Exam:  Elderly tachypneic white male in NAD  HEENT: Pupils equal and reactive. Nose and throat clear.  Neck:                          Supple, no JVD, thyromegaly, or adenopathy  Lungs: Coarse bilateral rhonchi.  Cardiovascular: Irregular rhythm without murmurs or gallops. . PACs and PVCs on monitor  Abdomen: Soft nontender without organomegaly or masses. Obese   and rectal: Gardiner catheter in place  Extremities: No cyanosis or clubbing but trace lower extremity edema.  Neurologic:                 Unresponsive to pain      INFUSIONS    Morphine          Results from last 7 days  Lab Units 10/01/18  0449 09/30/18  0525 09/29/18  0410   WBC 10*3/mm3 12.02* 16.22* 13.30*   HEMOGLOBIN g/dL 10.2* 11.4* 11.5*   HEMATOCRIT % 33.0* 36.5* 36.5*   PLATELETS 10*3/mm3 137* 138* 143*       Results from last 7 days  Lab Units 10/01/18  0449 09/30/18  0525   SODIUM mmol/L 140 151*   POTASSIUM mmol/L 3.8 4.1   CHLORIDE mmol/L 114* 120*   CO2 mmol/L 24.0 23.0   BUN mg/dL 32* 32*   CREATININE mg/dL 1.46* 1.47*   GLUCOSE mg/dL 215* 117*   CALCIUM mg/dL 7.9* 7.9*       Results from last 7 days  Lab Units 10/01/18  0449 09/30/18  0525 09/29/18  0410   MAGNESIUM mg/dL 2.1 2.0 2.2  2.2   PHOSPHORUS mg/dL 1.2* 2.5 2.2*  2.2*       Results from last 7 days  Lab Units 10/01/18  0449 09/29/18  0410   ALK PHOS U/L 65 86   BILIRUBIN mg/dL 1.6* 1.3*   ALT (SGPT) U/L 24 31   AST (SGOT) U/L 41* 104*       No results found for: SEDRATE  No results found for: BNP  Lab Results   Component Value Date    TROPONINI 2.410 (C) 09/27/2018     No results found for: TSH  Lab Results   Component Value Date    LACTATE 1.8 09/26/2018     No results found for: CORTISOL       Mode: BiPAP S/T (10/02/18 1327) Just discontinued today         Resp Rate (Set): 10 (10/02/18 1327) Resp Rate (Observed) Vent: 27 (10/02/18 1327)   FiO2 (%): 60 % (10/02/18 1327)    PEEP/CPAP (cm H2O): 10 cm H20  (18 1217)      Minute Ventilation (L/min) (Obs): 11.5 L/min (10/02/18 1327)        I reviewed the patient's results, images and medication.    Assessment/Plan   ASSESSMENT      Principal Problem:    CVA s/p TPA with hemorrhagic conversion 18 (CMS/HCC)  Active Problems:    HTN (hypertension)    Diabetes mellitus (CMS/[facility])    Hyperlipidemia    Coronary artery disease with history of CABG    PAF (paroxysmal atrial fibrillation) (CMS/HCC)    KEISHA (obstructive sleep apnea), CPAP intolerant    Ulcerative colitis (CMS/HCC)    Crohn's disease (CMS/HCC)      DISCUSSION: Time to focus only on comfort. He has shallow respirations and is quite tachypneic but appears comfortable with a morphine drip which is being adjusted. Family is at bedside and understand that he will likely  this evening off supportive measures such as BiPAP, antimicrobial therapy, nutrition etc. All are in agreement that this is appropriate as he has no chance for neurologic recovery. Long discussion with patient's son today. Situation also discuss with palliative care.    PLAN     1. Discontinue antibiotics, BiPAP, fluids, enteral feeds  2. Comfort measures only    Plan of care and goals reviewed with mulitdisciplinary team at daily rounds.    I discussed the patient's findings and my recommendations with family, nursing staff and consulting provider    Time spent Critical care 35 min (It does not include procedure time).    Shaji Dunn MD  Intensive Care Medicine  10/02/18 4:49 PM

## 2018-10-02 NOTE — PROGRESS NOTES
"Palliative Care Progress Note    Date of Admission: 9/26/2018    Code Status:   Current Code Status     Date Active Code Status Order ID Comments User Context       9/27/2018 11:03 AM No CPR [id number]  Case, Faith GEE, DO Inpatient       Questions for Current Code Status     Question Answer Comment    Code Status No CPR     Medical Interventions (Level of Support Prior to Arrest) Limited     Limited Support to NOT Include Dialysis      Intubation     Level Of Support Discussed With Next of Kin (If No Surrogate)         Advance Directive: living will  Surrogate decision maker:  Wife, Eloisa    Subjective:   Patient unresponsive to name  x2 morphine prns overnight  SonZay, spending the night with the patient. Reports that the patient has noisy respirations with removal of BiPaP mask, has noticed some moans with breathing effort.   Noted significant decrease in respiratory secretions and suctioning since stopping tube feeds and IV fluids    Reviewed current scheduled and prn medications for route, type, dose, and frequency.    Morphine      •  acetaminophen  •  acetaminophen **OR** [DISCONTINUED] acetaminophen  •  bisacodyl  •  LORazepam  •  metoprolol tartrate  •  Morphine  •  naloxone  •  sodium chloride  •  sodium chloride    Objective:  PPS 10%   /70 (BP Location: Left arm, Patient Position: Lying)   Pulse 116   Temp 98 °F (36.7 °C) (Axillary)   Resp 26   Ht 182.9 cm (72.01\")   Wt 111 kg (244 lb)   SpO2 94%   BMI 33.08 kg/m²    Intake & Output (last day)       10/01 0701 - 10/02 0700 10/02 0701 - 10/03 0700    I.V. (mL/kg) 6.7 (0.1)     Other 320     NG/     IV Piggyback 85     Total Intake(mL/kg) 908.7 (8.2)     Urine (mL/kg/hr) 1430 (0.5) 395 (0.5)    Total Output 1430 395    Net -521.3 -395          Unmeasured Urine Occurrence 1 x         Lab Results (last 24 hours)     Procedure Component Value Units Date/Time    POC Glucose Once [[id number]]  (Normal) Collected:  10/02/18 1123    " Specimen:  Blood Updated:  10/02/18 1125     Glucose 114 mg/dL     Narrative:       Meter: [id number] : [id number] samantha torres    POC Glucose Once [[id number]]  (Abnormal) Collected:  10/02/18 0524    Specimen:  Blood Updated:  10/02/18 0525     Glucose 132 (H) mg/dL     Narrative:       Meter: [id number] : [id number] Nettro Rhina    Blood Culture - Blood, [[id number]]  (Normal) Collected:  09/30/18 0020    Specimen:  Blood from Arm, Left Updated:  10/02/18 0130     Blood Culture No growth at 2 days    POC Glucose Once [[id number]]  (Abnormal) Collected:  10/01/18 2313    Specimen:  Blood Updated:  10/01/18 2314     Glucose 154 (H) mg/dL     Narrative:       Meter: [id number] : [id number] Nettro Rhina        Imaging Results (last 24 hours)     ** No results found for the last 24 hours. **          Physical Exam:  Gen: NAD, on BiPAP  Skin: warm, dry   Eyes: SKIP, conjunctiva clear and dry, facial edematous  Resp/thorax: even effort with use of abdominal muscles, slightly labored, BiPAP FiO2 60%  CV: tachycardia   ABD: soft, bowel sounds hypoactive, distended  : soriano to bedside drainage with light letitia urine  Ext: 2+ edema, no redness   Neuro: unresponsive, no myoclonus       Reviewed labs and diagnostic results.   Lab Results   Component Value Date    HGBA1C 6.20 (H) 09/27/2018       Results from last 7 days  Lab Units 10/01/18  0449   WBC 10*3/mm3 12.02*   HEMOGLOBIN g/dL 10.2*   HEMATOCRIT % 33.0*   PLATELETS 10*3/mm3 137*       Results from last 7 days  Lab Units 10/01/18  0449   SODIUM mmol/L 140   POTASSIUM mmol/L 3.8   CHLORIDE mmol/L 114*   CO2 mmol/L 24.0   BUN mg/dL 32*   CREATININE mg/dL 1.46*   CALCIUM mg/dL 7.9*   BILIRUBIN mg/dL 1.6*   ALK PHOS U/L 65   ALT (SGPT) U/L 24   AST (SGOT) U/L 41*   GLUCOSE mg/dL 215*       Impression: 78 y.o. male with acute Left SHIRA ischemic stroke and left frontal ICH hemorrhagic conversion, ankylosing spondylitis    Plan:   Dyspnea/restlessness - increase  morphine infusion 1mg/hour continue prns.   Changed to every 15 minutes prn with anticipation of removing BiPAP support.   Increase lorazepam to 1mg prns.     Respiratory secretions - scopolamine TD patch    Goals of care discussion - ongoing conversation with the wife and sons about the changes in the patient and recognition that he is dying.    Reviewed changes in medication to continue to manage dyspnea and restlessness, anxiety.   Relayed observations about facial mask with BiPAP and increase edema on his face, discomfort with the mask.   Family in agreement with medication changes in anticipation of removing BiPAP today.   Reviewed and updated nursing.    Time: 60 minutes   > 50% time spent in counseling and education concerning current orders for symptom management with wife and sons    Esther Bah, ROMINA  [phone]  10/02/18  2:35 PM

## 2018-10-02 NOTE — SIGNIFICANT NOTE
Exam confirms with auscultation zero audible heart tones and zero audible respirations. Mr.Raymond DEEPTHI Steele was pronounced dead at 1658.  MD notified by Patient's RN.    Johan Lang RN  Clinical House Supervisor  10/2/2018 5:27 PM

## 2018-10-04 LAB
BACTERIA SPEC AEROBE CULT: ABNORMAL
GRAM STN SPEC: ABNORMAL
GRAM STN SPEC: ABNORMAL
ISOLATED FROM: ABNORMAL

## 2018-10-04 NOTE — THERAPY DISCHARGE NOTE
Acute Care - Physical Therapy Discharge Summary  [facility]       Patient Name: Jorge Alberto Steele  : 1940  MRN: [id number]    Today's Date: 10/4/2018  Onset of Illness/Injury or Date of Surgery: 18    Date of Referral to PT: 18  Referring Physician: MD Warren      Admit Date: 2018      PT Recommendation and Plan    Visit Dx:    ICD-10-CM ICD-9-CM   1. Acute ischemic stroke (CMS/[facility]) I63.9 434.91   2. Renal insufficiency N28.9 593.9   3. Left hip pain M25.552 719.45   4. Impaired functional mobility, balance, gait, and endurance Z74.09 V49.89   5. Cognitive communication deficit R41.841 799.52   6. Dysphagia, unspecified type R13.10 787.20   7. Impaired mobility and ADLs Z74.09 799.89             Outcome Measures     Row Name 10/04/18 1000             Modified Crystal Scale    Modified Crystal Scale --   6  -ES        User Key  (r) = Recorded By, (t) = Taken By, (c) = Cosigned By    Initials Name Provider Type    ES Saccclint Hue P, PT Physical Therapist                    PT Rehab Goals     Row Name 10/04/18 1000             Bed Mobility Goal 1 (PT)    Activity/Assistive Device (Bed Mobility Goal 1, PT) sit to supine/supine to sit  -ES      [place] Level/Cues Needed (Bed Mobility Goal 1, PT) moderate assist (50-74% patient effort)  -ES      Time Frame (Bed Mobility Goal 1, PT) 2 weeks  -ES      Progress/Outcomes (Bed Mobility Goal 1, PT) goal ongoing  -ES         Transfer Goal 1 (PT)    Activity/Assistive Device (Transfer Goal 1, PT) sit-to-stand/stand-to-sit  -ES      [place] Level/Cues Needed (Transfer Goal 1, PT) maximum assist (25-49% patient effort)  -ES      Time Frame (Transfer Goal 1, PT) 2 weeks  -ES      Progress/Outcome (Transfer Goal 1, PT) goal ongoing  -ES        User Key  (r) = Recorded By, (t) = Taken By, (c) = Cosigned By    Initials Name Provider Type Discipline    ES Sacca, Hue P, PT Physical Therapist PT              PT Discharge Summary  Reason for Discharge:  Patient       Hue Day, PT   10/4/2018

## 2018-10-04 NOTE — THERAPY DISCHARGE NOTE
Acute Care - Physical Therapy Discharge Summary  [facility]       Patient Name: Jorge Alberto Steele  : 1940  MRN: [id number]    Today's Date: 10/4/2018  Onset of Illness/Injury or Date of Surgery: 18    Date of Referral to PT: 18  Referring Physician: MD Warren      Admit Date: 2018      PT Recommendation and Plan    Visit Dx:    ICD-10-CM ICD-9-CM   1. Acute ischemic stroke (CMS/[facility]) I63.9 434.91   2. Renal insufficiency N28.9 593.9   3. Left hip pain M25.552 719.45   4. Impaired functional mobility, balance, gait, and endurance Z74.09 V49.89   5. Cognitive communication deficit R41.841 799.52   6. Dysphagia, unspecified type R13.10 787.20   7. Impaired mobility and ADLs Z74.09 799.89             Outcome Measures     Row Name 10/04/18 1000             Modified Crystal Scale    Modified Crystal Scale —   6  -ES        User Key  (r) = Recorded By, (t) = Taken By, (c) = Cosigned By    Initials Name Provider Type    Hue Underwood, PT Physical Therapist                PT Discharge Summary  Reason for Discharge: Patient       Hue Day, PT   10/4/2018

## 2018-10-05 LAB — BACTERIA SPEC AEROBE CULT: NORMAL
